# Patient Record
Sex: FEMALE | Race: BLACK OR AFRICAN AMERICAN | NOT HISPANIC OR LATINO | Employment: FULL TIME | URBAN - METROPOLITAN AREA
[De-identification: names, ages, dates, MRNs, and addresses within clinical notes are randomized per-mention and may not be internally consistent; named-entity substitution may affect disease eponyms.]

---

## 2023-10-01 PROBLEM — G89.29 CHRONIC LEFT HIP PAIN: Status: ACTIVE | Noted: 2023-10-01

## 2023-10-01 PROBLEM — G89.29 CHRONIC PAIN: Status: ACTIVE | Noted: 2023-10-01

## 2023-10-01 PROBLEM — M99.01 SEGMENTAL AND SOMATIC DYSFUNCTION OF CERVICAL REGION: Status: ACTIVE | Noted: 2023-10-01

## 2023-10-01 PROBLEM — F41.1 GENERALIZED ANXIETY DISORDER: Status: ACTIVE | Noted: 2023-10-01

## 2023-10-01 PROBLEM — G47.33 OSA (OBSTRUCTIVE SLEEP APNEA): Status: ACTIVE | Noted: 2023-10-01

## 2023-10-01 PROBLEM — E78.5 HYPERLIPIDEMIA: Status: ACTIVE | Noted: 2023-10-01

## 2023-10-01 PROBLEM — M16.11 PRIMARY OSTEOARTHRITIS OF RIGHT HIP: Status: ACTIVE | Noted: 2023-10-01

## 2023-10-01 PROBLEM — R33.9 INCOMPLETE EMPTYING OF BLADDER: Status: ACTIVE | Noted: 2023-10-01

## 2023-10-01 PROBLEM — F17.210 TOBACCO DEPENDENCE DUE TO CIGARETTES: Status: ACTIVE | Noted: 2023-10-01

## 2023-10-01 PROBLEM — E66.811 CLASS 1 OBESITY WITH BODY MASS INDEX (BMI) OF 31.0 TO 31.9 IN ADULT: Status: ACTIVE | Noted: 2023-10-01

## 2023-10-01 PROBLEM — R06.09 DYSPNEA ON EXERTION: Status: ACTIVE | Noted: 2023-10-01

## 2023-10-01 PROBLEM — M99.03 SEGMENTAL AND SOMATIC DYSFUNCTION OF LUMBAR REGION: Status: ACTIVE | Noted: 2023-10-01

## 2023-10-01 PROBLEM — N95.0 POST-MENOPAUSAL BLEEDING: Status: ACTIVE | Noted: 2023-10-01

## 2023-10-01 PROBLEM — R74.8 ELEVATED LIVER ENZYMES: Status: ACTIVE | Noted: 2023-10-01

## 2023-10-01 PROBLEM — E66.9 CLASS 1 OBESITY WITH BODY MASS INDEX (BMI) OF 31.0 TO 31.9 IN ADULT: Status: ACTIVE | Noted: 2023-10-01

## 2023-10-01 PROBLEM — M99.05 SEGMENTAL AND SOMATIC DYSFUNCTION OF PELVIC REGION: Status: ACTIVE | Noted: 2023-10-01

## 2023-10-01 PROBLEM — M48.061 LUMBAR FORAMINAL STENOSIS: Status: ACTIVE | Noted: 2023-10-01

## 2023-10-01 PROBLEM — E03.9 HYPOTHYROIDISM: Status: ACTIVE | Noted: 2023-10-01

## 2023-10-28 PROBLEM — M16.10 ARTHRITIS OF HIP: Status: RESOLVED | Noted: 2018-11-29 | Resolved: 2023-10-28

## 2023-10-28 PROBLEM — Z47.1 AFTERCARE FOLLOWING JOINT REPLACEMENT SURGERY: Status: ACTIVE | Noted: 2021-06-29

## 2023-10-28 PROBLEM — S13.9XXA NECK SPRAIN: Status: ACTIVE | Noted: 2018-08-15

## 2024-06-09 DIAGNOSIS — I10 HYPERTENSION, UNSPECIFIED TYPE: ICD-10-CM

## 2024-06-09 DIAGNOSIS — M54.16 LUMBAR RADICULOPATHY: Primary | ICD-10-CM

## 2024-06-10 RX ORDER — LOSARTAN POTASSIUM 100 MG/1
100 TABLET ORAL DAILY
Qty: 90 TABLET | Refills: 3 | Status: SHIPPED | OUTPATIENT
Start: 2024-06-10 | End: 2025-06-10

## 2024-06-10 RX ORDER — GABAPENTIN 300 MG/1
300 CAPSULE ORAL 3 TIMES DAILY
Qty: 90 CAPSULE | Refills: 10 | Status: SHIPPED | OUTPATIENT
Start: 2024-06-10

## 2024-06-26 NOTE — H&P (VIEW-ONLY)
Subjective   Patient ID: Miguel Angel Dominguez is a 54 y.o. female who presents June 26, 2024 for neck/upper back and low back pain.    (3/15) VPCY    Today, the patient rates their degree of pain as a 4 out of 10 on the numeric pain rating scale.     Miguel Angel returns for continued treatment of their neck/upper back and low back pain. Patient states that she had improvement after last visit that has lasted until today. She states that she has been feeling really well lately. She continues to have neck/upper back discomfort. She states that there has been no change in right hip pain. Denies any associated symptoms or change in medical history since last visit and will follow up in 1 week.       Objective   Physical Exam  Constitutional:       General: She is not in acute distress.     Appearance: Normal appearance.       HENT:      Head: Normocephalic and atraumatic.   Musculoskeletal:      Cervical back: Tenderness present. Decreased range of motion.      Thoracic back: Tenderness present.      Lumbar back: Tenderness present. Decreased range of motion.   Neurological:      General: No focal deficit present.      Mental Status: She is alert and oriented to person, place, and time.      Cranial Nerves: No dysarthria or facial asymmetry.      Sensory: Sensation is intact.      Motor: Motor function is intact.      Coordination: Coordination is intact.      Gait: Gait is intact.   Psychiatric:         Attention and Perception: Attention normal.         Mood and Affect: Mood normal.         Speech: Speech normal.         Behavior: Behavior is cooperative.         Palpation of the following region(s) revealed:  Cervical: Suboccipitals bilateral, hypertonicity and tenderness.  Upper trapezius bilateral, hypertonicity and tenderness.  Levator scapulae bilateral, hypertonicity and tenderness.  Cervical paraspinals bilateral, hypertonicity and tenderness.  Thoracic: Thoracic paraspinals bilateral, hypertonicity and  tenderness.  Middle trapezius bilateral, hypertonicity and tenderness.  Lumbar: Lumbar paraspinals bilateral, hypertonicity and tenderness.  Gluteal bilateral, hypertonicity and tenderness.  Piriformis bilateral, hypertonicity and tenderness.  Upper Extremity: Pectoralis bilateral, hypertonicity and tenderness.      Segmental Joint(s): Segmental joint dysfunction was assessed with motion palpation and is identified in the following areas:  Cervical : C1 C4 C7  Thoracic : T1, T4, T5, and T9  Lumbopelvic / Sacral SIJ : L4, L5, L5/S1, and R SIJ      Assessment/Plan   Today's Treatment Included: Chiropractic manipulation to the Segmental Joint(s) Cervical : C1 C4 C7 Segmental Joint(s) Lumbopelvic/Sacral SIJ : L4, L5, L5/S1, and R SIJ Segmental Joint(s) Thoracic : T1, T4, T5, and T9   Treatment Techniques Used : Diversified CMT, Pelvic drop table technique, and Manual traction  Ischemic compression  Soft-Tissue manipulation    Soft-tissue mobilization was performed in the following areas:   Cervical paraspinal mm. bilateral, Upper Trapezius bilateral, and Levator Scap. bilateral  Thoracic Paraspinal mm. bilateral, Middle Trapezius bilateral, and Pectoralis bilateral  Lumbar Paraspinal mm. bilateral and Gluteal mm. Glute. Med. bilateral        The patient tolerated today's treatment with little or no additional discomfort and was instructed to contact the office for questions or concerns.

## 2024-07-05 ENCOUNTER — APPOINTMENT (OUTPATIENT)
Dept: INTEGRATIVE MEDICINE | Facility: CLINIC | Age: 54
End: 2024-07-05
Payer: COMMERCIAL

## 2024-07-09 DIAGNOSIS — M79.7 FIBROMYALGIA: ICD-10-CM

## 2024-07-09 RX ORDER — PERPHENAZINE 16 MG
TABLET ORAL
Qty: 120 CAPSULE | Refills: 11 | Status: SHIPPED | OUTPATIENT
Start: 2024-07-09 | End: 2024-07-09

## 2024-07-09 RX ORDER — PERPHENAZINE 16 MG
2 TABLET ORAL 3 TIMES DAILY
Qty: 180 CAPSULE | Refills: 11 | Status: SHIPPED | OUTPATIENT
Start: 2024-07-09

## 2024-07-15 ENCOUNTER — APPOINTMENT (OUTPATIENT)
Dept: PAIN MEDICINE | Facility: CLINIC | Age: 54
End: 2024-07-15
Payer: COMMERCIAL

## 2024-07-16 ENCOUNTER — HOSPITAL ENCOUNTER (OUTPATIENT)
Dept: PAIN MEDICINE | Facility: CLINIC | Age: 54
Discharge: HOME | End: 2024-07-16
Payer: COMMERCIAL

## 2024-07-16 VITALS
OXYGEN SATURATION: 99 % | SYSTOLIC BLOOD PRESSURE: 115 MMHG | HEART RATE: 72 BPM | DIASTOLIC BLOOD PRESSURE: 69 MMHG | BODY MASS INDEX: 30.18 KG/M2 | TEMPERATURE: 98.4 F | WEIGHT: 164 LBS | HEIGHT: 62 IN | RESPIRATION RATE: 18 BRPM

## 2024-07-16 DIAGNOSIS — M16.10 HIP ARTHRITIS: ICD-10-CM

## 2024-07-16 DIAGNOSIS — M79.7 FIBROMYALGIA: ICD-10-CM

## 2024-07-16 DIAGNOSIS — M25.551 PAIN OF RIGHT HIP: ICD-10-CM

## 2024-07-16 DIAGNOSIS — M75.82 ROTATOR CUFF TENDINITIS, LEFT: Primary | ICD-10-CM

## 2024-07-16 PROCEDURE — 2500000004 HC RX 250 GENERAL PHARMACY W/ HCPCS (ALT 636 FOR OP/ED): Performed by: ANESTHESIOLOGY

## 2024-07-16 PROCEDURE — 2550000001 HC RX 255 CONTRASTS: Performed by: ANESTHESIOLOGY

## 2024-07-16 PROCEDURE — 20610 DRAIN/INJ JOINT/BURSA W/O US: CPT | Performed by: ANESTHESIOLOGY

## 2024-07-16 PROCEDURE — 2500000005 HC RX 250 GENERAL PHARMACY W/O HCPCS: Performed by: ANESTHESIOLOGY

## 2024-07-16 PROCEDURE — 77002 NEEDLE LOCALIZATION BY XRAY: CPT | Performed by: ANESTHESIOLOGY

## 2024-07-16 PROCEDURE — 2500000002 HC RX 250 W HCPCS SELF ADMINISTERED DRUGS (ALT 637 FOR MEDICARE OP, ALT 636 FOR OP/ED): Performed by: ANESTHESIOLOGY

## 2024-07-16 PROCEDURE — 96372 THER/PROPH/DIAG INJ SC/IM: CPT | Performed by: ANESTHESIOLOGY

## 2024-07-16 RX ORDER — LIDOCAINE HYDROCHLORIDE 5 MG/ML
INJECTION, SOLUTION INFILTRATION; INTRAVENOUS AS NEEDED
Status: COMPLETED | OUTPATIENT
Start: 2024-07-16 | End: 2024-07-16

## 2024-07-16 RX ORDER — LORAZEPAM 2 MG/1
TABLET ORAL
Qty: 1 TABLET | Refills: 0 | Status: SHIPPED | OUTPATIENT
Start: 2024-07-16

## 2024-07-16 RX ORDER — BUPIVACAINE HYDROCHLORIDE 7.5 MG/ML
INJECTION, SOLUTION EPIDURAL; RETROBULBAR AS NEEDED
Status: COMPLETED | OUTPATIENT
Start: 2024-07-16 | End: 2024-07-16

## 2024-07-16 RX ORDER — SODIUM CHLORIDE, SODIUM LACTATE, POTASSIUM CHLORIDE, CALCIUM CHLORIDE 600; 310; 30; 20 MG/100ML; MG/100ML; MG/100ML; MG/100ML
20 INJECTION, SOLUTION INTRAVENOUS CONTINUOUS
OUTPATIENT
Start: 2024-07-16

## 2024-07-16 RX ORDER — DIAZEPAM 5 MG/1
10 TABLET ORAL ONCE
Status: COMPLETED | OUTPATIENT
Start: 2024-07-16 | End: 2024-07-16

## 2024-07-16 RX ORDER — TRIAMCINOLONE ACETONIDE 40 MG/ML
INJECTION, SUSPENSION INTRA-ARTICULAR; INTRAMUSCULAR AS NEEDED
Status: COMPLETED | OUTPATIENT
Start: 2024-07-16 | End: 2024-07-16

## 2024-07-16 ASSESSMENT — PAIN SCALES - GENERAL
PAINLEVEL_OUTOF10: 6
PAINLEVEL_OUTOF10: 5 - MODERATE PAIN

## 2024-07-16 ASSESSMENT — PAIN - FUNCTIONAL ASSESSMENT
PAIN_FUNCTIONAL_ASSESSMENT: 0-10
PAIN_FUNCTIONAL_ASSESSMENT: 0-10

## 2024-07-17 ENCOUNTER — APPOINTMENT (OUTPATIENT)
Dept: GASTROENTEROLOGY | Facility: HOSPITAL | Age: 54
End: 2024-07-17
Payer: COMMERCIAL

## 2024-08-07 ENCOUNTER — DOCUMENTATION (OUTPATIENT)
Dept: PHYSICAL THERAPY | Facility: CLINIC | Age: 54
End: 2024-08-07
Payer: COMMERCIAL

## 2024-08-07 NOTE — PROGRESS NOTES
Physical Therapy    Discharge Summary    Name: Miguel Angel Flores  MRN: [id number]  : 1970  Date: 24    Discharge Summary: PT    Discharge Information: Date of discharge 24, Date of last visit 24, Date of evaluation 24, Number of attended visits 3, Referred by Angeli, and Referred for IT band syndrome s/p RUTHANN    Therapy Summary: Patient seen in PT for 3 visits for hip pain s/p RUTHANN. Patient dns for her last PT visit and did not reschedule.      Discharge Status: Status unknown.      Rehab Discharge Reason: Failed to schedule and/or keep follow-up appointment(s)

## 2024-08-14 ENCOUNTER — OFFICE VISIT (OUTPATIENT)
Dept: CARDIOLOGY | Facility: HOSPITAL | Age: 54
End: 2024-08-14
Payer: COMMERCIAL

## 2024-08-14 VITALS
BODY MASS INDEX: 30 KG/M2 | HEIGHT: 62 IN | WEIGHT: 163 LBS | DIASTOLIC BLOOD PRESSURE: 75 MMHG | SYSTOLIC BLOOD PRESSURE: 115 MMHG | HEART RATE: 60 BPM

## 2024-08-14 DIAGNOSIS — R06.02 SHORTNESS OF BREATH: ICD-10-CM

## 2024-08-14 DIAGNOSIS — E78.5 HYPERLIPIDEMIA, UNSPECIFIED HYPERLIPIDEMIA TYPE: Primary | ICD-10-CM

## 2024-08-14 PROCEDURE — 99214 OFFICE O/P EST MOD 30 MIN: CPT | Performed by: INTERNAL MEDICINE

## 2024-08-14 PROCEDURE — 3008F BODY MASS INDEX DOCD: CPT | Performed by: INTERNAL MEDICINE

## 2024-08-14 NOTE — PROGRESS NOTES
Subjective:  Patient returns for a routine annual follow-up.  She is a 54-year-old female we follow for a recovered cardiomyopathy.  She did have a negative nuclear stress test remotely, so this was presumably a nonischemic cardiomyopathy.    She has not had any hospitalizations since her last visit.  Her medications generally appear to be largely unchanged, and she is taking these compliantly and is tolerating them well.    She denies any clear chest discomfort or palpitation symptoms.  Unfortunately, she has been noticing some progressively limiting exertional dyspnea over at least several months.  She denies any rest symptomatology.  She comes in today for further evaluation.  She did not need any prescriptions renewed.    Objective:  General: Alert, usual pleasant self.  HEENT: Unchanged.  Lungs: Clear without crackles or wheezing.  Cardiac: Distant heart tones without murmur rub or S3.  Abdomen: Nontender.  Extremities: No edema.  Skin: No acute rash.  Neuro: Grossly intact and unchanged.    EKG: Normal sinus rhythm.  Anterior T wave abnormality.  No acute changes.    Lipid panel: Cholesterol-164, HDL-56, LDL-75, TG-163.    Impression/plan:  Little unfortunately has been having some ongoing exertional dyspnea which certainly has her somewhat concerned.  I will plan on setting her up for a repeat echocardiogram just to see if we are not dealing with any recurrent myocardial dysfunction.  Pending the results of this, I will sort out whether we need to make further medication adjustments and also will sort out if we need to consider a repeat ischemic workup.    Patient's lipid panel generally appears to be in fairly good order.  We will continue to monitor this and also keep an eye on her LFTs which have been somewhat elevated.    Patient's heart rate and blood pressure remain under excellent control on her current regimen, so we will continue these unchanged.  She did not need any prescriptions renewed.    I  will review her echo results with her when they are available and make further recommendations as needed.  She knows to call for any intercurrent worsening of her symptomatology.    Patient instructions:    Continue current medications unchanged.    Obtain your echocardiogram when scheduled and call for results.    Return to clinic in 6 months.

## 2024-08-20 NOTE — PROGRESS NOTES
Patient: Miguel Angel Flores    [id number]  : 1970 -- AGE 54 y.o.    Provider: Shandra Woods MD PhD     Location  [facility]   Service Date: 2024              [facility]  Followup Visit Note    HISTORY OF PRESENT ILLNESS     The patient's referring provider is: Shana Tran, RILEY-CNP     HISTORY OF PRESENT ILLNESS   Miguel Angel Flores is a 54 y.o. female with past medical history significant for HTN, chronic pain, fibtromyalgia, back pain, hypothroidism, ARIANNA, hx of cardiomyopathy with recovery who presents to a [facility] for followup.       PAST SLEEP HISTORY  Presented 23 for concerns of JYOTI. She reported that she been told she had JYOTI in the past at [facility]. She has not seen a sleep provider in 3-4 years. She had daytime sleepiness, difficulties remembering things, irritability, and issues staying asleep. She had also been diagnosed with fibromyalgia and being seen at [facility] to implement lifetsyle changes. When she was on CPAP she did feel better and had more energy. However, she just has a hard time using it. She felt she was being smothered. She used nasal pillows and still having issues. She is on APAP at 8-16. She is using CPAP < 1x/week. She feels she just can't fall asleep with it on due to anxiety. She had been started on medication but has not been using recently. She is looking into ketamine infusions.     She did relate some issues with anxiety and depression. She has been seen for this in is on both buspirone and bupropion. There is some discussion about whether she might trial ketamine infusion.    She had a PSG on 4/3/24. She  minutes or SE of 95%. Her RDI3% was 20/h and RDI4% was 10/h. Her ROSALIA was 0.3/h. Her NREM AHI4% was 3/h and REM AHI4% was 38/h. With side sleping her AHI4% was 1.5/h and supine AHI4% was 23/h.    CURRENT HISTORY  At time of her last visit we had started her on APAP at 4-12  cwp for her REM-predominant JYOTI. We wanted to try a nasal mask with her and consider alternatives if persistent issues of sleepiness.    On today's visit,     Sleep schedule:    In bed: 10PM  Activities in bed:  Bedtime Activities: TV on  Subjective sleep latency:  by11-11:30PM  Awakenings during night: 1-2x  Length of awakenings: 30-45 min  Final awakening time: 4:30-5A  Out of bed: 6AM with alarm clock  Overall estimate of total sleep time: 5-6h    Weekends: similar  Preferred sleeping position: supine and sidelying  Typically sleeps alone.     She denies sleep paralysis, cataplexy, or hypnopompic hallucinations.  Some hearing of squeaks in middle of the night.      Daytime Symptoms  On awakening patient reports: wake unrefreshed and feels sleepy    Daytime: During the day, she does not doze unintentionally while inactive.  During more active tasks, she never is drowsy.  With regards to daytime napping, the patient reports sometimes taking naps. If she takes a nap, typically she awakens unrefreshed.  She does not report that poor sleep results in mood-related irritability. She does not report that poor sleep results in issues with memory/concentration.    ESS: 18  NORM: 22  FOSQ: 14      REVIEW OF SYSTEMS     REVIEW OF SYSTEMS  Review of Systems   All other systems reviewed and are negative.        ALLERGIES AND MEDICATIONS     ALLERGIES  Allergies   Allergen Reactions    Lisinopril Cough       MEDICATIONS: She has a current medication list which includes the following prescription(s): alpha lipoic acid - Take 2 capsules by mouth 3 times a day (Patient not taking: Reported on 8/14/2024), amlodipine - TAKE 1 TABLET BY MOUTH EVERY DAY, aspirin - TAKE 1 TABLET BY MOUTH EVERY DAY, atorvastatin - TAKE 1 TABLET BY MOUTH EVERY DAY, bupropion xl - Take 1 tablet (150 mg) by mouth once daily in the morning, coenzyme q-10 - Take 1 capsule (200 mg) by mouth 3 times a day, estradiol - Insert 0.25 Applicatorfuls (1 g) into the  vagina 3 (three) times a week, fluoxetine - Take 1 capsule (20 mg) by mouth once daily, fluticasone - Administer 1 spray into each nostril once daily, gabapentin - TAKE 1 CAPSULE BY MOUTH THREE TIMES A DAY, hydroxyzine hcl - Take 1 tablet (50 mg) by mouth 3 times a day as needed for anxiety, ibuprofen, levothyroxine - TAKE 1 TABLET BY MOUTH EVERY DAY, lorazepam - Take 1 p.o. 1 hour before the procedure, losartan - Take 1 tablet (100 mg) by mouth once daily, nicotine - Place 1 patch over 24 hours on the skin once every 24 hours, nicotine polacrilex - Chew 1 each (4 mg) if needed for smoking cessation, omeprazole - Take 1 capsule (20 mg) by mouth once daily in the morning. Take before meals. Do not crush or chew, polyethylene glycol-electrolytes - Take by mouth, solifenacin - Take 1 tablet (5 mg) by mouth once daily, sumatriptan - Take 1 tablet (50 mg) by mouth.  FOR MIGRAINE RELIEF. MAY REPEAT EVERY 2 HOURS. MAX 200MG/DAY, trazodone - Take 0.5-1 tablets (25-50 mg) by mouth as needed at bedtime, vitamin b complex - Take 1 tablet by mouth 2 times a day, vitamins b complex - Take 1 tablet by mouth 2 times a day, and [DISCONTINUED] amitriptyline - TAKE 1 TABLET BY MOUTH AT BEDTIME FOR MIGRAINE PREVENTION.    PAST MEDICAL HISTORY : She has Cardiomyopathy (Multi); Generalized anxiety disorder; Hyperlipidemia; Hypokalemia; Hypothyroidism; Incomplete emptying of bladder; Post-menopausal bleeding; Rotator cuff tendinitis, left; Rotator cuff tendinitis, right; Dyspnea on exertion; Shortness of breath; Tobacco dependence due to cigarettes; Vaginal atrophy; Vaginal discomfort; Vitamin D deficiency; Cervicalgia; Chronic pain; Class 1 obesity with body mass index (BMI) of 31.0 to 31.9 in adult; Elevated liver enzymes; Fibromyalgia; Hip osteoarthritis; Insomnia; Lumbar foraminal stenosis; Lumbar radiculopathy; Multilevel degenerative disc disease; Myalgia; JYOTI (obstructive sleep apnea); Primary osteoarthritis of right hip;  Sebaceous cyst; Segmental and somatic dysfunction of cervical region; Segmental and somatic dysfunction of lumbar region; Segmental and somatic dysfunction of pelvic region; Segmental and somatic dysfunction of thoracic region; Aftercare following joint replacement surgery; Other hyperlipidemia; Long term (current) use of aspirin; History of drug use; Nicotine dependence; Presence of left artificial hip joint; Abnormal glandular Papanicolaou smear of cervix; Alcohol abuse; Anxiety; Cardiac murmur; Complicated grief; Disc displacement, thoracic; Displacement of lumbar intervertebral disc without myelopathy; Female infertility; GERD (gastroesophageal reflux disease); Graves disease; Hallux valgus, acquired; History of Graves' disease; Lumbar pain; Mild episode of recurrent major depressive disorder (CMS-HCC); Neck sprain; Right knee pain; Sickle-cell trait (CMS-HCC); Tobacco use disorder; Lumbar sprain; PMB (postmenopausal bleeding); Irregular periods; and Iliotibial band syndrome, left on their problem list.    PAST SURGICAL HISTORY: She  has a past surgical history that includes Other surgical history (10/28/2020) and Other surgical history (01/04/2022).     FAMILY HISTORY: No changes since previous visit. Otherwise non-contributory as charted.     SOCIAL HISTORY  She  reports that she has been smoking cigarettes. She has a 17.5 pack-year smoking history. She has been exposed to tobacco smoke. She has never used smokeless tobacco. She reports current alcohol use of about 1.0 standard drink of alcohol per week. She reports current drug use. Drug: Marijuana.  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      PHYSICAL EXAM     Physical Examination: There were no vitals taken for this visit.    PREVIOUS WEIGHTS:  Wt Readings from Last 3 Encounters:   08/14/24 73.9 kg (163 lb)   07/16/24 74.4 kg (164 lb)   04/10/24 74.6 kg (164 lb 6.4 oz)       General: The patient is a pleasant female, in no acute distress. HEENT: She has a   a modified Mallampati grade 2 airway with Numbers; 0-4 (with +): 1+ tonsils bilaterally. The soft palate was normal and the uvula was normal. The A/P diameter of the velopharynx was not narrowed. The oropharynx was not shallow in the A/P diameter. Lateral wall narrowing was present. Tongue ridging waspresent. Erythema of the posterior pharynx was not present. Mucosal hypertrophy in the posterior oropharynx was not present. Retrognathia was not and micrognathia was not present. Neck: The neck was not enlarged. No JVP, bruits or lymphadenopathy was appreciated. Chest: Clear to auscultation. No wheezes, rales, or rhonchi. Cardiovascular: Regular rate and rhythm. No murmurs, gallops, or clicks. Abdomen: Soft, nontender, nondistended. Positive bowel sounds. Extremities: No clubbing, cyanosis, or edema is noted. Neurologic exam: Alert, oriented x3 and was grossly non-focal.      RESULTS/DATA     Iron (ug/dL)   Date Value   05/16/2023 92     Iron Saturation (%)   Date Value   05/16/2023 20 (L)     TIBC (ug/dL)   Date Value   05/16/2023 457 (H)     Ferritin (ug/L)   Date Value   05/16/2023 150       Bicarbonate (mmol/L)   Date Value   02/13/2024 24   05/03/2023 24   02/25/2022 26   01/14/2022 23       PAP Adherence  A PAP adherence download was obtained and data was reviewed personally today in clinic. (see scanned document in EPIC)      DIAGNOSES     Problem List and Orders  There are no diagnoses linked to this encounter.        ASSESSMENT/PLAN     Ms. Flores is a 54 y.o. female and with past medical history significant for HTN, chronic pain, fibtromyalgia, back pain, hypothroidism, ARIANNA.  She returns in followup to  the [facility] for their sleep apnea.    # JYOTI  -She has mild to moderate JYOTI based on her sleep testing - mostly REM-related JYOTI  - Her sleep apnea is more normalized with side sleeping  - Plan a trial of APAP at 4-12 cwp - adjustments to her old machine made  - Gave her a N20  medium mask sample to try  - If that does not work then we can think about alternatives - such as side sleeping - gave examples of sleep positioners  - See if treatment of JYOTI improves her excessive sleepiness    # Depression/Anxiety disorder  - this will play a role in her insomnia and fatigue  - addressing this will be important independent of sleep-related issues     #Chronic insomnia  -Unclear if some of this is related to her untreated sleep apnea  -We will see if this improves once she starts CPAP again or if it gets worse  -Addressing this with her mental health team will be helpful    # OBESITY: We also discussed that obesity is a risk factor for sleep apnea. Weight loss over time could be beneficial from the standpoint for sleep apnea and other health conditions and improving with weight loss.     Follow up: 3-4 months         Shandra Woods MD PhD

## 2024-08-21 ENCOUNTER — APPOINTMENT (OUTPATIENT)
Dept: SLEEP MEDICINE | Facility: CLINIC | Age: 54
End: 2024-08-21
Payer: COMMERCIAL

## 2024-08-29 ENCOUNTER — HOSPITAL ENCOUNTER (OUTPATIENT)
Dept: CARDIOLOGY | Facility: HOSPITAL | Age: 54
Discharge: HOME | End: 2024-08-29
Payer: COMMERCIAL

## 2024-08-29 DIAGNOSIS — R06.02 SHORTNESS OF BREATH: ICD-10-CM

## 2024-08-29 LAB
AORTIC VALVE MEAN GRADIENT: 2 MMHG
AORTIC VALVE PEAK VELOCITY: 0.95 M/S
AV PEAK GRADIENT: 3.6 MMHG
AVA (PEAK VEL): 2.31 CM2
AVA (VTI): 1.97 CM2
EJECTION FRACTION APICAL 4 CHAMBER: 56.4
EJECTION FRACTION: 58 %
LEFT ATRIUM VOLUME AREA LENGTH INDEX BSA: 24.5 ML/M2
LEFT VENTRICLE INTERNAL DIMENSION DIASTOLE: 4.7 CM (ref 3.5–6)
LEFT VENTRICULAR OUTFLOW TRACT DIAMETER: 1.9 CM
MITRAL VALVE E/A RATIO: 0.76
MITRAL VALVE E/E' RATIO: 7.8
RIGHT VENTRICLE FREE WALL PEAK S': 11 CM/S
RIGHT VENTRICLE PEAK SYSTOLIC PRESSURE: 22.5 MMHG
TRICUSPID ANNULAR PLANE SYSTOLIC EXCURSION: 2.4 CM

## 2024-08-29 PROCEDURE — 93306 TTE W/DOPPLER COMPLETE: CPT | Performed by: INTERNAL MEDICINE

## 2024-08-29 PROCEDURE — 93306 TTE W/DOPPLER COMPLETE: CPT

## 2024-09-09 NOTE — PROGRESS NOTES
SUBJECTIVE:  This is 54 y.o.  female with PMH of besity BMI 32, JYOTI, migraine, smoker and marijuana user, multiple musculoskeletal complaints, left RUTHANN and moderate right hip arthritis which failed injections under ultrasound by Ortho, fibromyalgia, migraine headache, lumbar spondylosis, failed IV infusion therapy but responds very well to chiropractic care and acupuncture and wants to continue with that.  She gets gabapentin minimal amount 300 mg takes him at night as needed.  Patient complains of right hip pain and x-ray showed last year progression of her osteoarthritis to moderate disease and she received on 7/16/2024 right hip intra-articular injection who is here for follow-up eating that the injection has helped her by 100% but only for few weeks and the pain is coming back.  The patient has failed in the past meloxicam and diclofenac.  I talked to her about celecoxib and I started her on 200 mg to take 2 of them together for the severe pain and see how long that lasts her for relief.  I recommended not to take the medication on a daily basis.  Will plan on repeat right hip intra-articular injection sometime soon in addition we will plan on psoas muscle injection of the left leg with the lesser trochanter area that may give her great relief of her left hip pain post RUTHANN.  The patient is not willing to have any hip replacement till after the new year when she can afford to take a time off      Prior office visit:  4/6/2024: This is 53 y.o.  female with PMH of obesity BMI 32, JYOTI, migraine, smoker and marijuana user, multiple musculoskeletal complaints, left RUTHANN and right hip arthritis which failed injections under ultrasound by Ortho, fibromyalgia, migraine headache, lumbar spondylosis, failed IV infusion therapy but responds very well to chiropractic care and acupuncture and wants to continue with that.  She gets gabapentin minimal amount 300 mg takes him at night as needed.  Patient received right hip joint  injection on 3/11/2024 who is here for follow-up stating that the hip injection took away her pain presently has minimal discomfort if if so.  The patient still worried about her right RUTHANN because she still have some residual pain in her left hip.  I explained to her that her residual pain from left hip is nothing compared to her left hip arthritis and she should be really happy with the result of which she had from her left hip.  And she should be planning on right hip replacement if the procedure stopped working.  I placed another order for the procedure when patient calls              Procedures:  7/16/2024 right hip joint injection patient has had 100% improvement in pain and function but only for 3 weeks and then gradually pain is coming back  3/11/2024 right hip joint injection the patient has had a 100% improvement in pain and function  IV infusion therapy the patient has had minimal% improvement in pain and function  Ultrasound-guided right hip injections by Ortho minimal response        Portions of record reviewed for pertinent issues: active problem list, medication list, allergies, family history, social history, notes from last encounter, encounters, lab results, imaging and other available records.     I have personally reviewed the [facility] report for this patient. This report is scanned into the electronic medical record. I have considered the risks of abuse, dependence, addiction and diversion. It showed no controlled substance only gabapentin 300 mg 3 times daily  OPIOID RISK ASSESSMENT SCORE 5/26 uses marijuana  Aberrant behavior: None           Employment/disability/litigation: now , Works as a  for [employer] full-time  Social History:  since 2015 now in the final process of adopting 2 beautiful twins Héctor and Sabrina who are present with her in exam. The patient is a smoker and occasional marijuana user denies drinking use of illicit drugs besides marijuana. The patient  finished high school and few college classes                    Diagnostic studies:  10/28/2023 right hip x-ray showed progression of arthritis and now it is moderate osteoarthritis    4/8/2023 MRI of the lumbar spine did not show any bone marrow edema or endplate changes principally the disc space was completely preserved no disc bulge or herniation, there is some facet joint hypertrophy and L4-5 L5-S1 with some spondylosis:  FINDINGS:  LUMBAR SPINE:     Alignment: The vertebral alignment is maintained.     Vertebrae/Intervertebral Discs: The vertebral bodies demonstrate  expected height. The marrow signal is within normal limits. The  intervertebral discs also demonstrate normal signal and morphology.     Conus Medullaris: The lower thoracic cord appears unremarkable. The  conus terminates at L1.     T12-L1: There is no significant central canal stenosis or neural  foraminal narrowing.     L1-2: There is no significant central canal stenosis or neural  foraminal narrowing.     L2-3: There is mild bilateral facet hypertrophy without significant  central canal stenosis or neural foraminal narrowing.     L3-4: There is mild bilateral neural foraminal stenosis secondary to  disc protrusion within the subarticular and foraminal zones and mild  facet hypertrophy. There is no significant central canal stenosis.     L4-5: There is mild-to-moderate facet hypertrophy and disc protrusion  within bilateral subarticular and foraminal zones with resulting mild  bilateral neural foraminal narrowing. There is no significant spinal  canal stenosis.     L5-S1: There is mild-to-moderate bilateral neural foraminal narrowing  secondary to disc protrusion within bilateral subarticular and  foraminal zones and moderate facet hypertrophy. There is no  significant spinal canal stenosis.     Redemonstration of right parapelvic renal cysts. The prevertebral and  posterior paraspinous soft tissues are otherwise grossly unremarkable  on  limited examination of such.     IMPRESSION:  1. Mild multilevel degenerative changes of the lumbar spine most  prominent from the levels of L4-S1 as detailed above.     12/20/2022 right hip x-ray showed mild arthritis         Plan  At least 50% of the visit was involved in the discussion of the options for treatment. We discussed exercises, medication, interventional therapies and surgery. Healthy life style is essential with patient hard work to achieve the wellness. In addition; discussion with the patient and/or family about any of the diagnostic results, impressions and/or recommended diagnostic studies, prognosis, risks and benefits of treatment options, instructions for treatment and/or follow-up, importance of compliance with chosen treatment options, risk-factor reduction, and patient/family education.         Recommended Pool therapy, walking in the pool, at least 3x per week for 30 minutes  Continue self-directed physical therapy with at least daily exercises for minimum of 20-minute, brochure was handed to the patient  Repeat right hip intra-articular injection when patient calls using 80 mg of Depo-Medrol, order and consent were done  Left lesser trochanter psoas muscle injection under fluoroscopy guidance to help with the left hip pain  Healthy lifestyle and anti-inflammatory diet in addition to weight control discussed with the patient  Alternative chronic pain therapies was discussed, encouraged and information was handed  Return to Clinic after the injection     *Please note this report has been produced using speech recognition software and may contain errors related to that system including grammar, punctuation and spelling as well as words and phrases that may be inappropriate. If there are questions or concerns, please feel free to contact me to clarify.    Juliano Riley MD

## 2024-09-10 ENCOUNTER — APPOINTMENT (OUTPATIENT)
Dept: PAIN MEDICINE | Facility: CLINIC | Age: 54
End: 2024-09-10
Payer: COMMERCIAL

## 2024-09-10 VITALS
HEIGHT: 62 IN | SYSTOLIC BLOOD PRESSURE: 95 MMHG | TEMPERATURE: 97.5 F | WEIGHT: 163 LBS | HEART RATE: 65 BPM | DIASTOLIC BLOOD PRESSURE: 73 MMHG | BODY MASS INDEX: 30 KG/M2 | OXYGEN SATURATION: 97 % | RESPIRATION RATE: 16 BRPM

## 2024-09-10 DIAGNOSIS — M16.10 HIP ARTHRITIS: Primary | ICD-10-CM

## 2024-09-10 PROCEDURE — 3008F BODY MASS INDEX DOCD: CPT | Performed by: ANESTHESIOLOGY

## 2024-09-10 PROCEDURE — 99214 OFFICE O/P EST MOD 30 MIN: CPT | Performed by: ANESTHESIOLOGY

## 2024-09-10 RX ORDER — CELECOXIB 200 MG/1
200 CAPSULE ORAL 2 TIMES DAILY
Qty: 60 CAPSULE | Refills: 5 | Status: SHIPPED | OUTPATIENT
Start: 2024-09-10 | End: 2025-03-09

## 2024-09-10 RX ORDER — CLONAZEPAM 0.5 MG/1
0.5 TABLET ORAL DAILY PRN
COMMUNITY
Start: 2024-08-29

## 2024-09-10 RX ORDER — MELOXICAM 7.5 MG/1
15 TABLET ORAL DAILY
Qty: 60 TABLET | Refills: 5 | Status: SHIPPED | OUTPATIENT
Start: 2024-09-10 | End: 2024-09-10 | Stop reason: WASHOUT

## 2024-09-10 SDOH — ECONOMIC STABILITY: FOOD INSECURITY: WITHIN THE PAST 12 MONTHS, YOU WORRIED THAT YOUR FOOD WOULD RUN OUT BEFORE YOU GOT MONEY TO BUY MORE.: NEVER TRUE

## 2024-09-10 SDOH — ECONOMIC STABILITY: FOOD INSECURITY: WITHIN THE PAST 12 MONTHS, THE FOOD YOU BOUGHT JUST DIDN'T LAST AND YOU DIDN'T HAVE MONEY TO GET MORE.: NEVER TRUE

## 2024-09-10 ASSESSMENT — PATIENT HEALTH QUESTIONNAIRE - PHQ9
7. TROUBLE CONCENTRATING ON THINGS, SUCH AS READING THE NEWSPAPER OR WATCHING TELEVISION: NEARLY EVERY DAY
2. FEELING DOWN, DEPRESSED OR HOPELESS: NEARLY EVERY DAY
5. POOR APPETITE OR OVEREATING: NEARLY EVERY DAY
SUM OF ALL RESPONSES TO PHQ9 QUESTIONS 1 AND 2: 6
4. FEELING TIRED OR HAVING LITTLE ENERGY: NEARLY EVERY DAY
3. TROUBLE FALLING OR STAYING ASLEEP OR SLEEPING TOO MUCH: NEARLY EVERY DAY
9. THOUGHTS THAT YOU WOULD BE BETTER OFF DEAD, OR OF HURTING YOURSELF: SEVERAL DAYS
10. IF YOU CHECKED OFF ANY PROBLEMS, HOW DIFFICULT HAVE THESE PROBLEMS MADE IT FOR YOU TO DO YOUR WORK, TAKE CARE OF THINGS AT HOME, OR GET ALONG WITH OTHER PEOPLE: SOMEWHAT DIFFICULT
SUM OF ALL RESPONSES TO PHQ QUESTIONS 1-9: 22
1. LITTLE INTEREST OR PLEASURE IN DOING THINGS: NEARLY EVERY DAY
6. FEELING BAD ABOUT YOURSELF - OR THAT YOU ARE A FAILURE OR HAVE LET YOURSELF OR YOUR FAMILY DOWN: NOT AT ALL
8. MOVING OR SPEAKING SO SLOWLY THAT OTHER PEOPLE COULD HAVE NOTICED. OR THE OPPOSITE, BEING SO FIGETY OR RESTLESS THAT YOU HAVE BEEN MOVING AROUND A LOT MORE THAN USUAL: NEARLY EVERY DAY

## 2024-09-10 ASSESSMENT — ENCOUNTER SYMPTOMS
LOSS OF SENSATION IN FEET: 0
DEPRESSION: 1
OCCASIONAL FEELINGS OF UNSTEADINESS: 1

## 2024-09-10 ASSESSMENT — PAIN - FUNCTIONAL ASSESSMENT: PAIN_FUNCTIONAL_ASSESSMENT: 0-10

## 2024-09-10 ASSESSMENT — PAIN SCALES - GENERAL
PAINLEVEL_OUTOF10: 8
PAINLEVEL: 10-WORST PAIN EVER

## 2024-09-10 ASSESSMENT — COLUMBIA-SUICIDE SEVERITY RATING SCALE - C-SSRS
6. HAVE YOU EVER DONE ANYTHING, STARTED TO DO ANYTHING, OR PREPARED TO DO ANYTHING TO END YOUR LIFE?: NO
2. HAVE YOU ACTUALLY HAD ANY THOUGHTS OF KILLING YOURSELF?: NO
1. IN THE PAST MONTH, HAVE YOU WISHED YOU WERE DEAD OR WISHED YOU COULD GO TO SLEEP AND NOT WAKE UP?: NO

## 2024-09-10 ASSESSMENT — PAIN DESCRIPTION - DESCRIPTORS: DESCRIPTORS: ACHING;SHARP

## 2024-09-10 ASSESSMENT — LIFESTYLE VARIABLES
HOW MANY STANDARD DRINKS CONTAINING ALCOHOL DO YOU HAVE ON A TYPICAL DAY: PATIENT DOES NOT DRINK
HAS A RELATIVE, FRIEND, DOCTOR, OR ANOTHER HEALTH PROFESSIONAL EXPRESSED CONCERN ABOUT YOUR DRINKING OR SUGGESTED YOU CUT DOWN: NO
SKIP TO QUESTIONS 9-10: 1
AUDIT TOTAL SCORE: 1
AUDIT-C TOTAL SCORE: 1
HOW OFTEN DO YOU HAVE SIX OR MORE DRINKS ON ONE OCCASION: NEVER
HOW OFTEN DO YOU HAVE A DRINK CONTAINING ALCOHOL: MONTHLY OR LESS
HAVE YOU OR SOMEONE ELSE BEEN INJURED AS A RESULT OF YOUR DRINKING: NO

## 2024-09-10 NOTE — PROGRESS NOTES
This is 54 y.o.  female with who has been treated for  right hip pain  . Pain was 100%  better, then started to to wear off two weeks ago . The pain is now back .The pain is described as achiness and sharp and is relieved by Lying down .Here for follow-up.     Patient has right sided groin pain, and right knee achy .  Chief Complaint   Patient presents with    Follow-up     PHQ 22  Pain Therapies: Injections

## 2024-09-14 DIAGNOSIS — M54.16 RADICULOPATHY, LUMBAR REGION: ICD-10-CM

## 2024-09-16 ENCOUNTER — TELEPHONE (OUTPATIENT)
Dept: CARDIOLOGY | Facility: HOSPITAL | Age: 54
End: 2024-09-16
Payer: COMMERCIAL

## 2024-09-16 RX ORDER — B-COMPLEX WITH VITAMIN C
1 TABLET ORAL 2 TIMES DAILY
Qty: 180 TABLET | Refills: 3 | Status: SHIPPED | OUTPATIENT
Start: 2024-09-16

## 2024-10-15 ENCOUNTER — TELEPHONE (OUTPATIENT)
Dept: PAIN MEDICINE | Facility: CLINIC | Age: 54
End: 2024-10-15
Payer: COMMERCIAL

## 2024-10-15 ENCOUNTER — APPOINTMENT (OUTPATIENT)
Dept: PAIN MEDICINE | Facility: CLINIC | Age: 54
End: 2024-10-15
Payer: COMMERCIAL

## 2024-10-15 DIAGNOSIS — M54.16 LUMBAR RADICULOPATHY: Primary | ICD-10-CM

## 2024-10-15 RX ORDER — LORAZEPAM 2 MG/1
TABLET ORAL
Qty: 1 TABLET | Refills: 0 | Status: SHIPPED | OUTPATIENT
Start: 2024-10-15

## 2024-10-16 ENCOUNTER — APPOINTMENT (OUTPATIENT)
Dept: PRIMARY CARE | Facility: CLINIC | Age: 54
End: 2024-10-16
Payer: COMMERCIAL

## 2024-10-16 DIAGNOSIS — N95.1 HOT FLASHES DUE TO MENOPAUSE: Primary | ICD-10-CM

## 2024-10-16 DIAGNOSIS — R82.998 DARK URINE: ICD-10-CM

## 2024-10-16 PROCEDURE — 99214 OFFICE O/P EST MOD 30 MIN: CPT | Performed by: NURSE PRACTITIONER

## 2024-10-16 RX ORDER — FLUOXETINE 10 MG/1
10 CAPSULE ORAL DAILY
Qty: 30 CAPSULE | Refills: 0 | Status: SHIPPED | OUTPATIENT
Start: 2024-10-16 | End: 2024-12-15

## 2024-10-16 ASSESSMENT — PATIENT HEALTH QUESTIONNAIRE - PHQ9
2. FEELING DOWN, DEPRESSED OR HOPELESS: NOT AT ALL
1. LITTLE INTEREST OR PLEASURE IN DOING THINGS: NOT AT ALL
SUM OF ALL RESPONSES TO PHQ9 QUESTIONS 1 AND 2: 0

## 2024-10-16 ASSESSMENT — ENCOUNTER SYMPTOMS
OCCASIONAL FEELINGS OF UNSTEADINESS: 0
LOSS OF SENSATION IN FEET: 0
DEPRESSION: 0

## 2024-10-16 NOTE — PROGRESS NOTES
Subjective   Patient ID: Miguel Angel Flores is a 54 y.o. female who presents virtually with complaints of sharp, shooting pain left side temple lasting 30 seconds. TTP. Left ear was sensitive to cold air or wind. Hx migraines in the colder months. This was occurring episodically. No pain for the past week. She tried OTC migraine medication but unsure if it had any effect.  In the past she took sumatriptan for migraines  Recently started Celebrex for arthritis, Ok'd by cardiologist. Also started on fluoxetine for hot flashes. Celebrex has helped quite a bit. No improvement in hot flashes with fluoxetine.     Also complaining of cloudy, dark urine  HPI    grief, anxiety, depression: followed by psych right now, weaned off Wellbutrin 150 mg and Lexapro 20 mg over the past few weeks, felt she no longer needed meds and liver enzymes improved. She has felt more anxious and depressed, restarted wellbutrin. She lost her brother 12/19/21. She lost her mother and brother in 1986 and did not respond well to the grief, she wound up abusing drugs to treat the pain.      hx PE: completed Eliquis. Provoked PE from left total hip replacement with Dr. Dillan Greco 6/28/21 at [facility]. She developed SOB and flank pain and went to ER- found to have PE LLL.   HTN, hx cardiomyopathy: followed by Dr. Wilehlm. On losartan 100 mg QD for HTN; did not tolerate Lisinopril r/t ACE cough. amlodipine 5 mg QD. Did not tolerate BB due to baseline bradycardia. Recent echo showed reduced ejection fraction. She is not checking BP at home.   GYN: followed by Dr. Gay. Pap UTD 02/2021. OAB: followed by Avani Ta; on solifenacin succinate 5 mg every day. Using estrace vaginal cream.   hypothyroidism: Graves disease, s/p thyroidectomy in ~2000 at [facility]. levothyroxine 125 mcg QD.   OA R hip: s/p hip replacement L hip. Followed by Kawme Suh. She is doing PT. Seeing pain management tomorrow  lumbar b/l radiculopathy:  responds to aqua  therapy, acupuncture. epidural steroid injections, and surgery down the line if no improvement with conservative measures. She will be seeing PM. She is taking gabapentin 300 mg 2 pills QHS PRN; which she thinks helps with nerve pain. pain did not improve with meloxicam and methocarbamol. She tried ketamine infusions for fibromyalgia with PM.   JYOTI: pt states she was tested and never started using cpap, will need repeat testing.      SOCIAL: She is living at home with 2 adopted children twin 12 year old girls, fostered at 8 y/o. 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   no alcohol use.   tobacco use: 1/2 ppd 10 cigarettes for 35 years. She has tried Chantix with no success.   Review of Systems  Objective   There were no vitals taken for this visit.    Physical Exam  Constitutional:       Appearance: Normal appearance.   Eyes:      Pupils: Pupils are equal, round, and reactive to light.   Neurological:      General: No focal deficit present.      Mental Status: She is alert and oriented to person, place, and time.   Psychiatric:         Mood and Affect: Mood normal.         Behavior: Behavior normal.         Thought Content: Thought content normal.         Judgment: Judgment normal.       Assessment/Plan   Diagnoses and all orders for this visit:  1. Hot flashes due to menopause (Primary)  - wean FLUoxetine (PROzac) 10 mg capsule; Take 1 capsule (10 mg) by mouth once daily.  Dispense: 30 capsule; Refill: 0  Pain in temples: seems to have resolved. If it comes back try ibuprofen 600 mg TID x1 day, limited use for NSAID due to celebrex. Can consider sumatriptan in the future, worked for her migraines in the past.   2. Dark urine- Microscopic Only, Urine; Future  - Urine Culture; Future   Tobacco dependence due to cigarettes  -     nicotine (Nicoderm CQ) 14 mg/24 hr patch; Place 1 patch over 24 hours on the skin once every 24 hours.  -      nicotine (Nicoderm CQ) 7 mg/24 hr patch; Place 1 patch over 24 hours on the skin once every 24 hours.  -     nicotine polacrilex (Nicorette) 4 mg gum; Chew 1 each (4 mg) if needed for smoking cessation.    migraines: imitrex PRN.   urinary incontinence: continue solifenacin succinate 5 mg QD per urogyn   hyperlipidemia: stable, continue atorvastatin 10 mg every day, lipid panel ordered   HTN: stable, continue amlodipine 5 mg QD, losartan 100 mg QD  hypothyroidism: levothyroxine to 125 mcg every day, TSH ordered.   vaginal irritation, OAB: continue estradiol cream per GYN    health maintenance: paps WNL 02/2021. mammogram due 05/2024. colonoscopy ordered.   CPE due 2/7/2025  Patient verbalized understanding and agree to plan of care.  Instructed to call or schedule appointment if any changes or as needed.

## 2024-10-31 ENCOUNTER — APPOINTMENT (OUTPATIENT)
Dept: INTEGRATIVE MEDICINE | Facility: CLINIC | Age: 54
End: 2024-10-31
Payer: COMMERCIAL

## 2024-11-01 ENCOUNTER — HOSPITAL ENCOUNTER (EMERGENCY)
Facility: HOSPITAL | Age: 54
Discharge: HOME | End: 2024-11-01
Attending: EMERGENCY MEDICINE
Payer: COMMERCIAL

## 2024-11-01 ENCOUNTER — APPOINTMENT (OUTPATIENT)
Dept: RADIOLOGY | Facility: HOSPITAL | Age: 54
End: 2024-11-01
Payer: COMMERCIAL

## 2024-11-01 VITALS
BODY MASS INDEX: 30 KG/M2 | DIASTOLIC BLOOD PRESSURE: 87 MMHG | HEIGHT: 62 IN | RESPIRATION RATE: 16 BRPM | HEART RATE: 72 BPM | SYSTOLIC BLOOD PRESSURE: 116 MMHG | WEIGHT: 163 LBS | TEMPERATURE: 99.5 F | OXYGEN SATURATION: 93 %

## 2024-11-01 DIAGNOSIS — J40 BRONCHITIS: ICD-10-CM

## 2024-11-01 DIAGNOSIS — U07.1 COVID: Primary | ICD-10-CM

## 2024-11-01 LAB
ALBUMIN SERPL BCP-MCNC: 4.4 G/DL (ref 3.4–5)
ALP SERPL-CCNC: 87 U/L (ref 33–110)
ALT SERPL W P-5'-P-CCNC: 86 U/L (ref 7–45)
ANION GAP SERPL CALC-SCNC: 11 MMOL/L (ref 10–20)
APTT PPP: 29 SECONDS (ref 27–38)
AST SERPL W P-5'-P-CCNC: 54 U/L (ref 9–39)
BASOPHILS # BLD AUTO: 0.03 X10*3/UL (ref 0–0.1)
BASOPHILS NFR BLD AUTO: 0.5 %
BILIRUB SERPL-MCNC: 0.7 MG/DL (ref 0–1.2)
BUN SERPL-MCNC: 9 MG/DL (ref 6–23)
CALCIUM SERPL-MCNC: 9 MG/DL (ref 8.6–10.3)
CARDIAC TROPONIN I PNL SERPL HS: 3 NG/L (ref 0–13)
CHLORIDE SERPL-SCNC: 111 MMOL/L (ref 98–107)
CO2 SERPL-SCNC: 24 MMOL/L (ref 21–32)
CREAT SERPL-MCNC: 0.87 MG/DL (ref 0.5–1.05)
EGFRCR SERPLBLD CKD-EPI 2021: 79 ML/MIN/1.73M*2
EOSINOPHIL # BLD AUTO: 0.11 X10*3/UL (ref 0–0.7)
EOSINOPHIL NFR BLD AUTO: 1.9 %
ERYTHROCYTE [DISTWIDTH] IN BLOOD BY AUTOMATED COUNT: 12.9 % (ref 11.5–14.5)
FLUAV RNA RESP QL NAA+PROBE: NOT DETECTED
FLUBV RNA RESP QL NAA+PROBE: NOT DETECTED
GLUCOSE SERPL-MCNC: 99 MG/DL (ref 74–99)
HCT VFR BLD AUTO: 41.7 % (ref 36–46)
HGB BLD-MCNC: 14.1 G/DL (ref 12–16)
IMM GRANULOCYTES # BLD AUTO: 0.02 X10*3/UL (ref 0–0.7)
IMM GRANULOCYTES NFR BLD AUTO: 0.3 % (ref 0–0.9)
INR PPP: 1.1 (ref 0.9–1.1)
LYMPHOCYTES # BLD AUTO: 1.76 X10*3/UL (ref 1.2–4.8)
LYMPHOCYTES NFR BLD AUTO: 30.1 %
MCH RBC QN AUTO: 30.2 PG (ref 26–34)
MCHC RBC AUTO-ENTMCNC: 33.8 G/DL (ref 32–36)
MCV RBC AUTO: 89 FL (ref 80–100)
MONOCYTES # BLD AUTO: 0.77 X10*3/UL (ref 0.1–1)
MONOCYTES NFR BLD AUTO: 13.2 %
NEUTROPHILS # BLD AUTO: 3.15 X10*3/UL (ref 1.2–7.7)
NEUTROPHILS NFR BLD AUTO: 54 %
NRBC BLD-RTO: 0 /100 WBCS (ref 0–0)
PLATELET # BLD AUTO: 217 X10*3/UL (ref 150–450)
POTASSIUM SERPL-SCNC: 3.7 MMOL/L (ref 3.5–5.3)
PROT SERPL-MCNC: 8.1 G/DL (ref 6.4–8.2)
PROTHROMBIN TIME: 12.8 SECONDS (ref 9.8–12.8)
RBC # BLD AUTO: 4.67 X10*6/UL (ref 4–5.2)
RSV RNA RESP QL NAA+PROBE: NOT DETECTED
SARS-COV-2 RNA RESP QL NAA+PROBE: DETECTED
SODIUM SERPL-SCNC: 142 MMOL/L (ref 136–145)
WBC # BLD AUTO: 5.8 X10*3/UL (ref 4.4–11.3)

## 2024-11-01 PROCEDURE — 85730 THROMBOPLASTIN TIME PARTIAL: CPT | Performed by: EMERGENCY MEDICINE

## 2024-11-01 PROCEDURE — 36415 COLL VENOUS BLD VENIPUNCTURE: CPT | Performed by: EMERGENCY MEDICINE

## 2024-11-01 PROCEDURE — 85610 PROTHROMBIN TIME: CPT | Performed by: EMERGENCY MEDICINE

## 2024-11-01 PROCEDURE — 99283 EMERGENCY DEPT VISIT LOW MDM: CPT | Mod: 25

## 2024-11-01 PROCEDURE — 2500000002 HC RX 250 W HCPCS SELF ADMINISTERED DRUGS (ALT 637 FOR MEDICARE OP, ALT 636 FOR OP/ED): Performed by: EMERGENCY MEDICINE

## 2024-11-01 PROCEDURE — 80053 COMPREHEN METABOLIC PANEL: CPT | Performed by: EMERGENCY MEDICINE

## 2024-11-01 PROCEDURE — 87634 RSV DNA/RNA AMP PROBE: CPT | Performed by: EMERGENCY MEDICINE

## 2024-11-01 PROCEDURE — 94640 AIRWAY INHALATION TREATMENT: CPT

## 2024-11-01 PROCEDURE — 84484 ASSAY OF TROPONIN QUANT: CPT | Performed by: EMERGENCY MEDICINE

## 2024-11-01 PROCEDURE — 2500000004 HC RX 250 GENERAL PHARMACY W/ HCPCS (ALT 636 FOR OP/ED): Performed by: EMERGENCY MEDICINE

## 2024-11-01 PROCEDURE — 87636 SARSCOV2 & INF A&B AMP PRB: CPT | Performed by: EMERGENCY MEDICINE

## 2024-11-01 PROCEDURE — 71045 X-RAY EXAM CHEST 1 VIEW: CPT

## 2024-11-01 PROCEDURE — 85025 COMPLETE CBC W/AUTO DIFF WBC: CPT | Performed by: EMERGENCY MEDICINE

## 2024-11-01 PROCEDURE — 71045 X-RAY EXAM CHEST 1 VIEW: CPT | Performed by: RADIOLOGY

## 2024-11-01 RX ORDER — ALBUTEROL SULFATE 0.83 MG/ML
2.5 SOLUTION RESPIRATORY (INHALATION) EVERY 6 HOURS PRN
Qty: 3 ML | Refills: 0 | Status: SHIPPED | OUTPATIENT
Start: 2024-11-01 | End: 2024-12-01

## 2024-11-01 RX ORDER — PREDNISONE 20 MG/1
20 TABLET ORAL ONCE
Status: COMPLETED | OUTPATIENT
Start: 2024-11-01 | End: 2024-11-01

## 2024-11-01 RX ORDER — ALBUTEROL SULFATE 0.83 MG/ML
2.5 SOLUTION RESPIRATORY (INHALATION) ONCE
Status: COMPLETED | OUTPATIENT
Start: 2024-11-01 | End: 2024-11-01

## 2024-11-01 RX ORDER — PREDNISONE 20 MG/1
20 TABLET ORAL DAILY
Qty: 5 TABLET | Refills: 0 | Status: SHIPPED | OUTPATIENT
Start: 2024-11-01 | End: 2024-11-06

## 2024-11-01 ASSESSMENT — PAIN DESCRIPTION - ORIENTATION: ORIENTATION: INNER

## 2024-11-01 ASSESSMENT — PAIN - FUNCTIONAL ASSESSMENT: PAIN_FUNCTIONAL_ASSESSMENT: 0-10

## 2024-11-01 ASSESSMENT — COLUMBIA-SUICIDE SEVERITY RATING SCALE - C-SSRS
2. HAVE YOU ACTUALLY HAD ANY THOUGHTS OF KILLING YOURSELF?: NO
6. HAVE YOU EVER DONE ANYTHING, STARTED TO DO ANYTHING, OR PREPARED TO DO ANYTHING TO END YOUR LIFE?: NO
1. IN THE PAST MONTH, HAVE YOU WISHED YOU WERE DEAD OR WISHED YOU COULD GO TO SLEEP AND NOT WAKE UP?: NO

## 2024-11-01 ASSESSMENT — PAIN DESCRIPTION - PAIN TYPE: TYPE: ACUTE PAIN

## 2024-11-01 ASSESSMENT — PAIN SCALES - GENERAL: PAINLEVEL_OUTOF10: 10 - WORST POSSIBLE PAIN

## 2024-11-01 ASSESSMENT — PAIN DESCRIPTION - LOCATION: LOCATION: HEAD

## 2024-11-06 ENCOUNTER — APPOINTMENT (OUTPATIENT)
Dept: INTEGRATIVE MEDICINE | Facility: CLINIC | Age: 54
End: 2024-11-06
Payer: COMMERCIAL

## 2024-11-06 ENCOUNTER — TELEPHONE (OUTPATIENT)
Dept: PRIMARY CARE | Facility: CLINIC | Age: 54
End: 2024-11-06
Payer: COMMERCIAL

## 2024-11-06 NOTE — TELEPHONE ENCOUNTER
Patient calling because she was recently diagnosed with Covid and bronchitis in the ER. Patient was given prednisone and she has been taking Delsym and robitussin. She wants to know if there is anything you can prescribe for her cough. Please advise.

## 2024-11-09 DIAGNOSIS — N95.1 HOT FLASHES DUE TO MENOPAUSE: ICD-10-CM

## 2024-11-11 ENCOUNTER — OFFICE VISIT (OUTPATIENT)
Dept: PRIMARY CARE | Facility: CLINIC | Age: 54
End: 2024-11-11
Payer: COMMERCIAL

## 2024-11-11 VITALS
WEIGHT: 158.73 LBS | SYSTOLIC BLOOD PRESSURE: 122 MMHG | OXYGEN SATURATION: 96 % | HEART RATE: 57 BPM | BODY MASS INDEX: 29.03 KG/M2 | DIASTOLIC BLOOD PRESSURE: 79 MMHG

## 2024-11-11 DIAGNOSIS — R05.3 PERSISTENT COUGH: Primary | ICD-10-CM

## 2024-11-11 PROCEDURE — 99213 OFFICE O/P EST LOW 20 MIN: CPT | Performed by: NURSE PRACTITIONER

## 2024-11-11 RX ORDER — DOXYCYCLINE 100 MG/1
100 CAPSULE ORAL 2 TIMES DAILY
Qty: 14 CAPSULE | Refills: 0 | Status: SHIPPED | OUTPATIENT
Start: 2024-11-11 | End: 2024-11-18

## 2024-11-11 ASSESSMENT — ENCOUNTER SYMPTOMS
LOSS OF SENSATION IN FEET: 0
OCCASIONAL FEELINGS OF UNSTEADINESS: 0
DEPRESSION: 0

## 2024-11-11 NOTE — PROGRESS NOTES
Subjective   Patient ID: Miguel Angel Flores is a 54 y.o. female who presents with complaints of chest pain following covid 2 weeks ago. Still feeling fatigued, tired. Still coughing, SOB. Mucinex has been helping.     HPI    grief, anxiety, depression: followed by psych right now, weaned off Wellbutrin 150 mg and Lexapro 20 mg over the past few weeks, felt she no longer needed meds and liver enzymes improved. She has felt more anxious and depressed, restarted wellbutrin. She lost her brother 12/19/21. She lost her mother and brother in 1986 and did not respond well to the grief, she wound up abusing drugs to treat the pain.      hx PE: completed Eliquis. Provoked PE from left total hip replacement with Dr. Dillan Greco 6/28/21 at [facility]. She developed SOB and flank pain and went to ER- found to have PE LLL.   HTN, hx cardiomyopathy: followed by Dr. Wilhelm. On losartan 100 mg QD for HTN; did not tolerate Lisinopril r/t ACE cough. amlodipine 5 mg QD. Did not tolerate BB due to baseline bradycardia. Recent echo showed reduced ejection fraction. She is not checking BP at home.   GYN: followed by Dr. Gay. Pap UTD 02/2021. OAB: followed by Avani Ta; on solifenacin succinate 5 mg every day. Using estrace vaginal cream.   hypothyroidism: Graves disease, s/p thyroidectomy in ~2000 at [facility]. levothyroxine 125 mcg QD.   OA R hip: s/p hip replacement L hip. Followed by Kwame Suh. She is doing PT. Seeing pain management tomorrow  lumbar b/l radiculopathy:  responds to aqua therapy, acupuncture. epidural steroid injections, and surgery down the line if no improvement with conservative measures. She will be seeing PM. She is taking gabapentin 300 mg 2 pills QHS PRN; which she thinks helps with nerve pain. pain did not improve with meloxicam and methocarbamol. She tried ketamine infusions for fibromyalgia with PM. Recently started Celebrex for arthritis, Ok'd by cardiologist. Also started on fluoxetine for  hot flashes. Celebrex has helped quite a bit.  JYOTI: pt states she was tested and never started using cpap, will need repeat testing.      SOCIAL: She is living at home with 2 adopted children twin 12 year old girls, fostered at 8 y/o. 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 Chelsey dog at home.   no alcohol use.   tobacco use: 1/2 ppd 10 cigarettes for 35 years. She has tried Chantix with no success.   Review of Systems   Cardiovascular:  Positive for chest pain.     Objective   /79   Pulse 57   Wt 72 kg (158 lb 11.7 oz)   LMP  (LMP Unknown)   SpO2 95%   BMI 29.03 kg/m²     Physical Exam  Constitutional:       Appearance: Normal appearance.   Eyes:      Pupils: Pupils are equal, round, and reactive to light.   Cardiovascular:      Rate and Rhythm: Normal rate and regular rhythm.   Pulmonary:      Effort: Pulmonary effort is normal. No respiratory distress.      Breath sounds: Normal breath sounds. No wheezing or rhonchi.   Chest:      Chest wall: No tenderness.   Neurological:      General: No focal deficit present.      Mental Status: She is alert and oriented to person, place, and time.   Psychiatric:         Mood and Affect: Mood normal.         Behavior: Behavior normal.         Thought Content: Thought content normal.         Judgment: Judgment normal.       Assessment/Plan   Diagnoses and all orders for this visit:  1. Persistent cough (Primary)  - XR chest 2 views; Future  - doxycycline (Vibramycin) 100 mg capsule; Take 1 capsule (100 mg) by mouth 2 times a day for 7 days. Take with at least 8 ounces (large glass) of water, do not lie down for 30 minutes after  Dispense: 14 capsule; Refill: 0   Continue mucinex. Discussed possibility of costochondritis.     Pain in temples: seems to have resolved. If it comes back try ibuprofen 600 mg TID x1 day, limited use for NSAID due to celebrex. Can consider sumatriptan in the future, worked for her  migraines in the past.     Tobacco dependence due to cigarettes  -     nicotine (Nicoderm CQ) 14 mg/24 hr patch; Place 1 patch over 24 hours on the skin once every 24 hours.  -     nicotine (Nicoderm CQ) 7 mg/24 hr patch; Place 1 patch over 24 hours on the skin once every 24 hours.  -     nicotine polacrilex (Nicorette) 4 mg gum; Chew 1 each (4 mg) if needed for smoking cessation.    migraines: imitrex PRN.   urinary incontinence: continue solifenacin succinate 5 mg QD per urogyn   hyperlipidemia: stable, continue atorvastatin 10 mg every day, lipid panel ordered   HTN: stable, continue amlodipine 5 mg QD, losartan 100 mg QD  hypothyroidism: levothyroxine to 125 mcg every day, TSH ordered.   vaginal irritation, OAB: continue estradiol cream per GYN    health maintenance: paps WNL 02/2021. mammogram due 05/2024. colonoscopy ordered.   CPE due 2/7/2025  Patient verbalized understanding and agree to plan of care.  Instructed to call or schedule appointment if any changes or as needed.

## 2024-11-12 DIAGNOSIS — I42.9 CARDIOMYOPATHY, UNSPECIFIED TYPE (MULTI): ICD-10-CM

## 2024-11-12 DIAGNOSIS — M79.7 FIBROMYALGIA: ICD-10-CM

## 2024-11-12 DIAGNOSIS — I10 HYPERTENSION, UNSPECIFIED TYPE: ICD-10-CM

## 2024-11-12 RX ORDER — AMLODIPINE BESYLATE 5 MG/1
5 TABLET ORAL DAILY
Qty: 90 TABLET | Refills: 3 | Status: SHIPPED | OUTPATIENT
Start: 2024-11-12 | End: 2025-11-12

## 2024-11-12 RX ORDER — FLUOXETINE 10 MG/1
10 CAPSULE ORAL DAILY
Qty: 90 CAPSULE | Refills: 1 | Status: SHIPPED | OUTPATIENT
Start: 2024-11-12

## 2024-11-13 ENCOUNTER — LAB (OUTPATIENT)
Dept: LAB | Facility: LAB | Age: 54
End: 2024-11-13
Payer: COMMERCIAL

## 2024-11-13 ENCOUNTER — HOSPITAL ENCOUNTER (OUTPATIENT)
Dept: RADIOLOGY | Facility: HOSPITAL | Age: 54
Discharge: HOME | End: 2024-11-13
Payer: COMMERCIAL

## 2024-11-13 DIAGNOSIS — R05.3 PERSISTENT COUGH: ICD-10-CM

## 2024-11-13 DIAGNOSIS — R82.998 DARK URINE: ICD-10-CM

## 2024-11-13 LAB
AMORPH CRY #/AREA UR COMP ASSIST: ABNORMAL /HPF
BACTERIA #/AREA URNS AUTO: ABNORMAL /HPF
MUCOUS THREADS #/AREA URNS AUTO: ABNORMAL /LPF
RBC #/AREA URNS AUTO: ABNORMAL /HPF
SQUAMOUS #/AREA URNS AUTO: ABNORMAL /HPF
WBC #/AREA URNS AUTO: ABNORMAL /HPF

## 2024-11-13 PROCEDURE — 81001 URINALYSIS AUTO W/SCOPE: CPT

## 2024-11-13 PROCEDURE — 71046 X-RAY EXAM CHEST 2 VIEWS: CPT

## 2024-11-13 PROCEDURE — 71046 X-RAY EXAM CHEST 2 VIEWS: CPT | Performed by: STUDENT IN AN ORGANIZED HEALTH CARE EDUCATION/TRAINING PROGRAM

## 2024-11-13 RX ORDER — ASPIRIN 81 MG/1
81 TABLET ORAL DAILY
Qty: 90 TABLET | Refills: 3 | Status: SHIPPED | OUTPATIENT
Start: 2024-11-13

## 2024-11-14 ENCOUNTER — APPOINTMENT (OUTPATIENT)
Dept: INTEGRATIVE MEDICINE | Facility: CLINIC | Age: 54
End: 2024-11-14
Payer: COMMERCIAL

## 2024-11-14 RX ORDER — ACETAMINOPHEN 160 MG/5ML
200 SUSPENSION, ORAL (FINAL DOSE FORM) ORAL 3 TIMES DAILY
Qty: 90 CAPSULE | Refills: 11 | Status: SHIPPED | OUTPATIENT
Start: 2024-11-14

## 2024-11-19 ENCOUNTER — APPOINTMENT (OUTPATIENT)
Dept: PAIN MEDICINE | Facility: CLINIC | Age: 54
End: 2024-11-19
Payer: COMMERCIAL

## 2024-11-20 ENCOUNTER — APPOINTMENT (OUTPATIENT)
Dept: INTEGRATIVE MEDICINE | Facility: CLINIC | Age: 54
End: 2024-11-20
Payer: COMMERCIAL

## 2024-11-25 ENCOUNTER — APPOINTMENT (OUTPATIENT)
Dept: PAIN MEDICINE | Facility: CLINIC | Age: 54
End: 2024-11-25
Payer: COMMERCIAL

## 2024-11-26 DIAGNOSIS — M16.11 UNILATERAL PRIMARY OSTEOARTHRITIS, RIGHT HIP: ICD-10-CM

## 2024-11-27 RX ORDER — IBUPROFEN 800 MG/1
800 TABLET ORAL 3 TIMES DAILY
Qty: 30 TABLET | Refills: 35 | Status: SHIPPED | OUTPATIENT
Start: 2024-11-27

## 2024-12-03 NOTE — PROGRESS NOTES
Patient: Miguel Angel Flores    [id number]  : 1970 -- AGE 54 y.o.    Provider: Shandra Woods MD PhD     Location  [facility]   Service Date: 12/3/2024              [facility]  Followup Visit Note    HISTORY OF PRESENT ILLNESS     The patient's referring provider is: Shana Tran, RILEY-CNP     HISTORY OF PRESENT ILLNESS   Miguel Angel Flores is a 54 y.o. female with past medical history significant for HTN, chronic pain, fibtromyalgia, back pain, hypothroidism, ARIANNA who presents to a [facility] for followup.       PAST SLEEP HISTORY  Presented 23 for concerns of JYOTI. She reported that she been told she had JYOTI in the past at [facility], [place], operated by [facility]. She has not seen a sleep provider in 3-4 years. She had daytime sleepiness, difficulties remembering things, irritability, and issues staying asleep. She had also been diagnosed with fibromyalgia and being seen at [facility] to implement lifetsyle changes. When she was on CPAP she did feel better and had more energy. However, she just has a hard time using it. She felt she was being smothered. She used nasal pillows and still having issues. She is on APAP at 8-16. She is using CPAP < 1x/week. She feels she just can't fall asleep with it on due to anxiety. She had been started on medication but has not been using recently. She is looking into ketamine infusions.     She did relate some issues with anxiety and depression. She has been seen for this in is on both buspirone and bupropion. There is some discussion about whether she might trial ketamine infusion.    She had a PSG on 4/3/24. She  minutes or SE of 95%. Her RDI3% was 20/h and RDI4% was 10/h. Her ROSALIA was 0.3/h. Her NREM AHI4% was 3/h and REM AHI4% was 38/h. With side sleping her AHI4% was 1.5/h and supine AHI4% was 23/h.    CURRENT HISTORY  At time of her last visit we had recommended treating her JYOTI with APAP at 4-12 cwp. Side sleeping  was another consideration. Goal was to see if her sleepiness improves with treatment.     On today's visit,         Sleep schedule:    In bed: 10PM  Activities in bed:  Bedtime Activities: TV on  Subjective sleep latency:  by11-11:30PM  Awakenings during night: 1-2x  Length of awakenings: 30-45 min  Final awakening time: 4:30-5A  Out of bed: 6AM with alarm clock  Overall estimate of total sleep time: 5-6h    Weekends: similar  Preferred sleeping position: supine and sidelying  Typically sleeps alone.     She denies sleep paralysis, cataplexy, or hypnopompic hallucinations.  Some hearing of squeaks in middle of the night.      Daytime Symptoms  On awakening patient reports: wake unrefreshed and feels sleepy    Daytime: During the day, she does not doze unintentionally while inactive.  During more active tasks, she never is drowsy.  With regards to daytime napping, the patient reports sometimes taking naps. If she takes a nap, typically she awakens unrefreshed.  She does not report that poor sleep results in mood-related irritability. She does not report that poor sleep results in issues with memory/concentration.    ESS: 18  NORM: 22  FOSQ: 14      REVIEW OF SYSTEMS     REVIEW OF SYSTEMS  Review of Systems   All other systems reviewed and are negative.        ALLERGIES AND MEDICATIONS     ALLERGIES  Allergies   Allergen Reactions    Lisinopril Cough       MEDICATIONS: She has a current medication list which includes the following prescription(s): albuterol - Take 3 mL (2.5 mg) by nebulization every 6 hours if needed for wheezing, alpha lipoic acid - Take 2 capsules by mouth 3 times a day, amlodipine - Take 1 tablet (5 mg) by mouth once daily, aspirin - TAKE 1 TABLET BY MOUTH EVERY DAY, atorvastatin - TAKE 1 TABLET BY MOUTH EVERY DAY, bupropion xl - Take 1 tablet (150 mg) by mouth once daily in the morning, celecoxib - Take 1 capsule (200 mg) by mouth 2 times a day, clonazepam - Take 1 tablet (0.5 mg) by mouth once  daily as needed, coenzyme q-10 - TAKE 1 CAPSULE BY MOUTH THREE TIMES A DAY, estradiol - Insert 0.25 Applicatorfuls (1 g) into the vagina 3 (three) times a week, fluoxetine - TAKE 1 CAPSULE BY MOUTH ONCE DAILY, fluticasone - Administer 1 spray into each nostril once daily, gabapentin - TAKE 1 CAPSULE BY MOUTH THREE TIMES A DAY, hydroxyzine hcl - Take 1 tablet (50 mg) by mouth 3 times a day as needed for anxiety, ibuprofen - TAKE 1 TABLET (800 MG) BY MOUTH 3 TIMES A DAY, levothyroxine - TAKE 1 TABLET BY MOUTH EVERY DAY, lorazepam - Take 1 p.o. 1 hour before the procedure, lorazepam - Take 1 p.o. 1 hour before the procedure, losartan - Take 1 tablet (100 mg) by mouth once daily, nicotine - Place 1 patch over 24 hours on the skin once every 24 hours, nicotine polacrilex - Chew 1 each (4 mg) if needed for smoking cessation, omeprazole - Take 1 capsule (20 mg) by mouth once daily in the morning. Take before meals. Do not crush or chew, polyethylene glycol-electrolytes - Take by mouth, solifenacin - Take 1 tablet (5 mg) by mouth once daily, sumatriptan - Take 1 tablet (50 mg) by mouth.  FOR MIGRAINE RELIEF. MAY REPEAT EVERY 2 HOURS. MAX 200MG/DAY, trazodone - Take 0.5-1 tablets (25-50 mg) by mouth as needed at bedtime, vit b comp/folic/choline/inosi - Take 1 tablet by mouth 2 times a day, vitamins b complex - TAKE 1 TABLET BY MOUTH TWICE A DAY, and [DISCONTINUED] amitriptyline - TAKE 1 TABLET BY MOUTH AT BEDTIME FOR MIGRAINE PREVENTION.    PAST MEDICAL HISTORY : She has Cardiomyopathy; Generalized anxiety disorder; Hyperlipidemia; Hypokalemia; Hypothyroidism; Incomplete emptying of bladder; Post-menopausal bleeding; Rotator cuff tendinitis, left; Rotator cuff tendinitis, right; Dyspnea on exertion; Shortness of breath; Tobacco dependence due to cigarettes; Vaginal atrophy; Vaginal discomfort; Vitamin D deficiency; Cervicalgia; Chronic pain; Class 1 obesity with body mass index (BMI) of 31.0 to 31.9 in adult; Elevated liver  enzymes; Fibromyalgia; Hip osteoarthritis; Insomnia; Lumbar foraminal stenosis; Lumbar radiculopathy; Multilevel degenerative disc disease; Myalgia; JYOTI (obstructive sleep apnea); Primary osteoarthritis of right hip; Sebaceous cyst; Segmental and somatic dysfunction of cervical region; Segmental and somatic dysfunction of lumbar region; Segmental and somatic dysfunction of pelvic region; Segmental and somatic dysfunction of thoracic region; Aftercare following joint replacement surgery; Other hyperlipidemia; Long term (current) use of aspirin; History of drug use; Nicotine dependence; Presence of left artificial hip joint; Abnormal glandular Papanicolaou smear of cervix; Alcohol abuse; Anxiety; Cardiac murmur; Complicated grief; Disc displacement, thoracic; Displacement of lumbar intervertebral disc without myelopathy; Female infertility; GERD (gastroesophageal reflux disease); Graves disease; Hallux valgus, acquired; History of Graves' disease; Lumbar pain; Mild episode of recurrent major depressive disorder (CMS-HCC); Neck sprain; Right knee pain; Sickle-cell trait (CMS-HCC); Tobacco use disorder; Lumbar sprain; PMB (postmenopausal bleeding); Irregular periods; and Iliotibial band syndrome, left on their problem list.    PAST SURGICAL HISTORY: She  has a past surgical history that includes Other surgical history (10/28/2020) and Other surgical history (01/04/2022).     FAMILY HISTORY: No changes since previous visit. Otherwise non-contributory as charted.     SOCIAL HISTORY  She  reports that she has been smoking cigarettes. She has a 17.5 pack-year smoking history. She has been exposed to tobacco smoke. She has never used smokeless tobacco. She reports current alcohol use of about 1.0 standard drink of alcohol per week. She reports current drug use. Drug: Marijuana.  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      PHYSICAL EXAM     Physical Examination: LMP  (LMP Unknown)     PREVIOUS WEIGHTS:  Wt Readings from  Last 3 Encounters:   11/11/24 72 kg (158 lb 11.7 oz)   11/01/24 73.9 kg (163 lb)   09/10/24 73.9 kg (163 lb)       General: The patient is a pleasant female, in no acute distress. HEENT: She has a  a modified Mallampati grade 2 airway with Numbers; 0-4 (with +): 1+ tonsils bilaterally. The soft palate was normal and the uvula was normal. The A/P diameter of the velopharynx was not narrowed. The oropharynx was not shallow in the A/P diameter. Lateral wall narrowing was present. Tongue ridging waspresent. Erythema of the posterior pharynx was not present. Mucosal hypertrophy in the posterior oropharynx was not present. Retrognathia was not and micrognathia was not present. Neck: The neck was not enlarged. No JVP, bruits or lymphadenopathy was appreciated. Chest: Clear to auscultation. No wheezes, rales, or rhonchi. Cardiovascular: Regular rate and rhythm. No murmurs, gallops, or clicks. Abdomen: Soft, nontender, nondistended. Positive bowel sounds. Extremities: No clubbing, cyanosis, or edema is noted. Neurologic exam: Alert, oriented x3 and was grossly non-focal.      RESULTS/DATA     Iron (ug/dL)   Date Value   05/16/2023 92     Iron Saturation (%)   Date Value   05/16/2023 20 (L)     TIBC (ug/dL)   Date Value   05/16/2023 457 (H)     Ferritin (ug/L)   Date Value   05/16/2023 150       Bicarbonate (mmol/L)   Date Value   11/01/2024 24   02/13/2024 24   05/03/2023 24   02/25/2022 26   01/14/2022 23       PAP Adherence  A PAP adherence download was obtained and data was reviewed personally today in clinic. (see scanned document in EPIC)      DIAGNOSES     Problem List and Orders  There are no diagnoses linked to this encounter.        ASSESSMENT/PLAN     Ms. Flores is a 54 y.o. female and with past medical history significant for HTN, chronic pain, fibtromyalgia, back pain, hypothroidism, ARIANNA.  She returns in followup to  the [facility] for their sleep apnea.    # JYOTI  -She has mild to  moderate JYOTI based on her sleep testing - mostly REM-related JYOTI  - Her sleep apnea is more normalized with side sleeping  - Plan a trial of APAP at 4-12 cwp - adjustments to her old machine made  - Gave her a N20 medium mask sample to try  - If that does not work then we can think about alternatives - such as side sleeping - gave examples of sleep positioners  - See if treatment of JYOTI improves her excessive sleepiness    # Depression/Anxiety disorder  - this will play a role in her insomnia and fatigue  - addressing this will be important independent of sleep-related issues     #Chronic insomnia  -Unclear if some of this is related to her untreated sleep apnea  -We will see if this improves once she starts CPAP again or if it gets worse  -Addressing this with her mental health team will be helpful    # OBESITY: We also discussed that obesity is a risk factor for sleep apnea. Weight loss over time could be beneficial from the standpoint for sleep apnea and other health conditions and improving with weight loss.     Follow up: 3-4 months         Shandra Woods MD PhD

## 2024-12-04 ENCOUNTER — APPOINTMENT (OUTPATIENT)
Dept: SLEEP MEDICINE | Facility: CLINIC | Age: 54
End: 2024-12-04
Payer: COMMERCIAL

## 2024-12-09 ENCOUNTER — APPOINTMENT (OUTPATIENT)
Dept: PRIMARY CARE | Facility: CLINIC | Age: 54
End: 2024-12-09
Payer: COMMERCIAL

## 2024-12-09 DIAGNOSIS — M79.7 FIBROMYALGIA: Primary | ICD-10-CM

## 2024-12-09 PROCEDURE — 99212 OFFICE O/P EST SF 10 MIN: CPT | Performed by: NURSE PRACTITIONER

## 2024-12-09 NOTE — PROGRESS NOTES
An interactive audio and video telecommunication system which permits real time communications between the patient (at the originating site) and provider (at the distant site) was utilized to provide this telehealth service.    Verbal consent was requested and obtained from Miguel Angel Flores  on this date, 12/09/24  , for a telehealth visit.    Subjective   Patient ID: Miguel Angel Flores is a 54 y.o. female who presents virtually requesting disability papers to be completed for her fibromyalgia. I have had no part in treating this diagnosis for her and recommended she discuss further with PM. She would also need functional capacity testing which I put in referral for.     HPI    grief, anxiety, depression: followed by psych right now, weaned off Wellbutrin 150 mg and Lexapro 20 mg over the past few weeks, felt she no longer needed meds and liver enzymes improved. She has felt more anxious and depressed, restarted wellbutrin. She lost her brother 12/19/21. She lost her mother and brother in 1986 and did not respond well to the grief, she wound up abusing drugs to treat the pain.      hx PE: completed Eliquis. Provoked PE from left total hip replacement with Dr. Dillan Greco 6/28/21 at [facility]. She developed SOB and flank pain and went to ER- found to have PE LLL.   HTN, hx cardiomyopathy: followed by Dr. Wilhelm. On losartan 100 mg QD for HTN; did not tolerate Lisinopril r/t ACE cough. amlodipine 5 mg QD. Did not tolerate BB due to baseline bradycardia. Recent echo showed reduced ejection fraction. She is not checking BP at home.   GYN: followed by Dr. Gay. Pap UTD 02/2021. OAB: followed by Avani Ta; on solifenacin succinate 5 mg every day. Using estrace vaginal cream.   hypothyroidism: Graves disease, s/p thyroidectomy in ~2000 at [facility]. levothyroxine 125 mcg QD.   OA R hip: s/p hip replacement L hip. Followed by Kwame Suh. She is doing PT. Seeing pain management tomorrow  lumbar b/l  radiculopathy:  responds to aqua therapy, acupuncture. epidural steroid injections, and surgery down the line if no improvement with conservative measures. She will be seeing PM. She is taking gabapentin 300 mg 2 pills QHS PRN; which she thinks helps with nerve pain. pain did not improve with meloxicam and methocarbamol. She tried ketamine infusions for fibromyalgia with PM. Recently started Celebrex for arthritis, Ok'd by cardiologist. Also started on fluoxetine for hot flashes. Celebrex has helped quite a bit.  JYOTI: pt states she was tested and never started using cpap, will need repeat testing.      SOCIAL: She is living at home with 2 adopted children twin 12 year old girls, fostered at 10 y/o. 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 [employer] dog at home.   no alcohol use.   tobacco use: 1/2 ppd 10 cigarettes for 35 years. She has tried Chantix with no success.   Review of Systems  Objective   LMP  (LMP Unknown)     Physical Exam  Assessment/Plan   Diagnoses and all orders for this visit:  Fibromyalgia: functional capacity testing referral ordered. Pt will schedule.     Tobacco dependence due to cigarettes  -     nicotine (Nicoderm CQ) 14 mg/24 hr patch; Place 1 patch over 24 hours on the skin once every 24 hours.  -     nicotine (Nicoderm CQ) 7 mg/24 hr patch; Place 1 patch over 24 hours on the skin once every 24 hours.  -     nicotine polacrilex (Nicorette) 4 mg gum; Chew 1 each (4 mg) if needed for smoking cessation.    migraines: imitrex PRN.   urinary incontinence: continue solifenacin succinate 5 mg QD per urogyn   hyperlipidemia: stable, continue atorvastatin 10 mg every day, lipid panel ordered   HTN: stable, continue amlodipine 5 mg QD, losartan 100 mg QD  hypothyroidism: levothyroxine to 125 mcg every day, TSH ordered.   vaginal irritation, OAB: continue estradiol cream per GYN    health maintenance: paps WNL 02/2021. mammogram due 05/2024.  colonoscopy ordered.   CPE due 2/7/2025  Patient verbalized understanding and agree to plan of care.  Instructed to call or schedule appointment if any changes or as needed.

## 2024-12-13 ENCOUNTER — APPOINTMENT (OUTPATIENT)
Dept: URGENT CARE | Age: 54
End: 2024-12-13
Payer: COMMERCIAL

## 2024-12-13 ENCOUNTER — HOSPITAL ENCOUNTER (EMERGENCY)
Facility: HOSPITAL | Age: 54
Discharge: HOME | End: 2024-12-13
Payer: COMMERCIAL

## 2024-12-13 VITALS
WEIGHT: 158 LBS | SYSTOLIC BLOOD PRESSURE: 123 MMHG | OXYGEN SATURATION: 94 % | TEMPERATURE: 97.3 F | HEIGHT: 62 IN | HEART RATE: 67 BPM | DIASTOLIC BLOOD PRESSURE: 82 MMHG | RESPIRATION RATE: 18 BRPM | BODY MASS INDEX: 29.08 KG/M2

## 2024-12-13 DIAGNOSIS — H60.501 ACUTE OTITIS EXTERNA OF RIGHT EAR, UNSPECIFIED TYPE: Primary | ICD-10-CM

## 2024-12-13 LAB
FLUAV RNA RESP QL NAA+PROBE: NOT DETECTED
FLUBV RNA RESP QL NAA+PROBE: NOT DETECTED
SARS-COV-2 RNA RESP QL NAA+PROBE: NOT DETECTED

## 2024-12-13 PROCEDURE — 87636 SARSCOV2 & INF A&B AMP PRB: CPT | Performed by: EMERGENCY MEDICINE

## 2024-12-13 PROCEDURE — 99283 EMERGENCY DEPT VISIT LOW MDM: CPT

## 2024-12-13 PROCEDURE — 2500000001 HC RX 250 WO HCPCS SELF ADMINISTERED DRUGS (ALT 637 FOR MEDICARE OP): Performed by: NURSE PRACTITIONER

## 2024-12-13 PROCEDURE — 2500000002 HC RX 250 W HCPCS SELF ADMINISTERED DRUGS (ALT 637 FOR MEDICARE OP, ALT 636 FOR OP/ED): Performed by: NURSE PRACTITIONER

## 2024-12-13 RX ORDER — NAPROXEN 500 MG/1
500 TABLET ORAL
Qty: 20 TABLET | Refills: 0 | Status: SHIPPED | OUTPATIENT
Start: 2024-12-13 | End: 2024-12-23

## 2024-12-13 RX ORDER — AMOXICILLIN 500 MG/1
1000 TABLET, FILM COATED ORAL DAILY
Qty: 20 TABLET | Refills: 0 | Status: SHIPPED | OUTPATIENT
Start: 2024-12-13 | End: 2024-12-23

## 2024-12-13 RX ORDER — CIPROFLOXACIN AND DEXAMETHASONE 3; 1 MG/ML; MG/ML
4 SUSPENSION/ DROPS AURICULAR (OTIC) ONCE
Status: COMPLETED | OUTPATIENT
Start: 2024-12-13 | End: 2024-12-13

## 2024-12-13 RX ORDER — NAPROXEN 500 MG/1
500 TABLET ORAL ONCE
Status: DISCONTINUED | OUTPATIENT
Start: 2024-12-13 | End: 2024-12-13 | Stop reason: HOSPADM

## 2024-12-13 RX ORDER — CIPROFLOXACIN AND DEXAMETHASONE 3; 1 MG/ML; MG/ML
4 SUSPENSION/ DROPS AURICULAR (OTIC) 2 TIMES DAILY
Qty: 7.5 ML | Refills: 0 | Status: SHIPPED | OUTPATIENT
Start: 2024-12-13 | End: 2024-12-20

## 2024-12-13 RX ORDER — AMOXICILLIN 500 MG/1
500 CAPSULE ORAL ONCE
Status: COMPLETED | OUTPATIENT
Start: 2024-12-13 | End: 2024-12-13

## 2024-12-13 ASSESSMENT — PAIN SCALES - GENERAL: PAINLEVEL_OUTOF10: 10 - WORST POSSIBLE PAIN

## 2024-12-13 ASSESSMENT — PAIN DESCRIPTION - LOCATION: LOCATION: EAR

## 2024-12-13 ASSESSMENT — PAIN - FUNCTIONAL ASSESSMENT: PAIN_FUNCTIONAL_ASSESSMENT: 0-10

## 2024-12-13 ASSESSMENT — PAIN DESCRIPTION - ORIENTATION: ORIENTATION: RIGHT

## 2024-12-13 NOTE — ED TRIAGE NOTES
Pt c/o earache x2 days; pt rpeorts during day ear started bleeding, generalized body aches, cough x3 weeks.

## 2024-12-13 NOTE — ED PROVIDER NOTES
HPI   Chief Complaint   Patient presents with    Earache       54-year-old female presents today with right otalgia.  It started yesterday.  She noticed a little bleeding on the exterior aspect of her ear canal.  She did not stick anything in her ear.  She rates the pain 8 out of 10.  She denies headache.  She denies epistaxis.  She denies hemoptysis.  She denies nausea or vomiting.  She has a history of fibromyalgia.  The prescriptive reporting authority has an overdose score of 320 and she has received 9 different prescriptions from 6 different providers.  Most of her prescriptions are for anxiety.  Patient notes that her furnace was out for 2 weeks until the landlord finally got it restarted in the last couple days.  Patient was diagnosed with COVID November 1 and she noted that her niece and her 1-year-old were both ill with colds during the week.  She believes she has had some complications from COVID.  She endorses coughing but it has completely cleared up since Thanksgiving.  She was placed on doxycycline.  She denies vision change.  She denies headache.  She denies sinus congestion.  She denies pharyngitis.  She denies skin rash.  Her only allergy is lisinopril with cough.      History provided by:  Patient   used: No            Patient History   Past Medical History:   Diagnosis Date    Abdominal pain, right lower quadrant 06/27/2006    Formatting of this note might be different from the original. Chronic intemittent since 10-05    Cardiomyopathy     Fibromyalgia 2023    Hyperthyroidism     Primary osteoarthritis of left hip 07/12/2019    Formatting of this note might be different from the original. Added automatically from request for surgery [id number]     Past Surgical History:   Procedure Laterality Date    OTHER SURGICAL HISTORY  10/28/2020    Ectopic pregnancy removal    OTHER SURGICAL HISTORY  01/04/2022    Hip replacement     Family History   Problem Relation Name Age of Onset     Cardiomyopathy Father      Prostate cancer Father      Heart attack Father      Diabetes Maternal Grandmother      Breast cancer Paternal Grandmother      Diabetes Mother's Sister       Social History     Tobacco Use    Smoking status: Every Day     Current packs/day: 0.50     Average packs/day: 0.5 packs/day for 35.0 years (17.5 ttl pk-yrs)     Types: Cigarettes     Passive exposure: Current    Smokeless tobacco: Never   Vaping Use    Vaping status: Never Used   Substance Use Topics    Alcohol use: Yes     Alcohol/week: 1.0 standard drink of alcohol     Types: 1 Glasses of wine per week    Drug use: Yes     Types: Marijuana     Comment: Occ Marijuana       Physical Exam   ED Triage Vitals [12/13/24 1744]   Temperature Heart Rate Respirations BP   36.3 °C (97.3 °F) 67 18 123/82      Pulse Ox Temp src Heart Rate Source Patient Position   94 % -- Monitor --      BP Location FiO2 (%)     -- --       Physical Exam  Constitutional:       Appearance: Normal appearance.   HENT:      Head: Normocephalic and atraumatic.      Left Ear: Tympanic membrane normal.      Ears:      Comments: Blood at the external ear canal and it is erythematous and tender to the touch     Nose: Nose normal.      Mouth/Throat:      Mouth: Mucous membranes are moist.   Eyes:      Extraocular Movements: Extraocular movements intact.      Pupils: Pupils are equal, round, and reactive to light.   Cardiovascular:      Rate and Rhythm: Normal rate and regular rhythm.      Pulses: Normal pulses.      Heart sounds: Normal heart sounds.   Pulmonary:      Effort: Pulmonary effort is normal.      Breath sounds: Normal breath sounds.   Abdominal:      General: Abdomen is flat.      Palpations: Abdomen is soft.   Musculoskeletal:         General: Normal range of motion.      Cervical back: Normal range of motion and neck supple.   Skin:     General: Skin is warm.      Capillary Refill: Capillary refill takes less than 2 seconds.   Neurological:      General:  No focal deficit present.      Mental Status: She is alert and oriented to person, place, and time.   Psychiatric:         Mood and Affect: Mood normal.         Behavior: Behavior normal.           ED Course & MDM   Diagnoses as of 12/13/24 1845   Acute otitis externa of right ear, unspecified type                 No data recorded     [place] Coma Scale Score: 15 (12/13/24 1746 : Jocelynn Saini RN)                           Medical Decision Making  There was blood at the external ear canal.  She received Ciprodex for pain as it as a steroid and Naprosyn.  She was referred to ENT and I recommended outpatient follow-up as soon as possible.  She was placed on the Ciprodex and amoxicillin for 10 days.  This should help clear up infection but return precautions were reviewed.  There was no neurologic deficit remaining physical exam was normal.  Safely discharged home with return precautions.        Procedure  Procedures     Jerman Akhtar, RILEY-CNP  12/13/24 1845

## 2024-12-18 ENCOUNTER — APPOINTMENT (OUTPATIENT)
Dept: OTOLARYNGOLOGY | Facility: CLINIC | Age: 54
End: 2024-12-18
Payer: COMMERCIAL

## 2024-12-20 ENCOUNTER — OFFICE VISIT (OUTPATIENT)
Dept: OTOLARYNGOLOGY | Facility: CLINIC | Age: 54
End: 2024-12-20
Payer: COMMERCIAL

## 2024-12-20 VITALS — HEIGHT: 62 IN | BODY MASS INDEX: 28.34 KG/M2 | WEIGHT: 154 LBS | TEMPERATURE: 97.8 F

## 2024-12-20 DIAGNOSIS — H91.91 HEARING DIFFICULTY OF RIGHT EAR: ICD-10-CM

## 2024-12-20 DIAGNOSIS — H73.011 BULLOUS MYRINGITIS OF RIGHT EAR: Primary | ICD-10-CM

## 2024-12-20 DIAGNOSIS — H92.01 RIGHT EAR PAIN: ICD-10-CM

## 2024-12-20 DIAGNOSIS — H93.8X1 SENSATION OF FULLNESS IN RIGHT EAR: ICD-10-CM

## 2024-12-20 PROCEDURE — 3008F BODY MASS INDEX DOCD: CPT | Performed by: NURSE PRACTITIONER

## 2024-12-20 PROCEDURE — 99204 OFFICE O/P NEW MOD 45 MIN: CPT | Performed by: NURSE PRACTITIONER

## 2024-12-20 RX ORDER — AZITHROMYCIN 250 MG/1
TABLET, FILM COATED ORAL
Qty: 6 TABLET | Refills: 0 | Status: SHIPPED | OUTPATIENT
Start: 2024-12-20 | End: 2024-12-25

## 2024-12-20 ASSESSMENT — PATIENT HEALTH QUESTIONNAIRE - PHQ9
SUM OF ALL RESPONSES TO PHQ9 QUESTIONS 1 AND 2: 0
2. FEELING DOWN, DEPRESSED OR HOPELESS: NOT AT ALL
1. LITTLE INTEREST OR PLEASURE IN DOING THINGS: NOT AT ALL

## 2024-12-20 NOTE — H&P (VIEW-ONLY)
Subjective   Patient ID: Miguel Angel Flores is a 54 y.o. female who presents for Follow-up (From ED for bleeding and pain in right ear).  HPI  The patient is referred for evaluation of a right sided ear infection.  When asked about ear pain, hearing loss, discharge from ear, tinnitus, aural fullness or autophony in the affected ear or ears, the patient admits to sudden onset of right-sided otalgia, fullness, hearing difficulty.  She was evaluated in the ED 1 week ago and started on amoxicillin and Ciprodex drops.  Since then, pain has significantly improved, but she has occasional painful sensations as well as ongoing fullness and hearing difficulty.  She was noted to have blood in the right ear canal when evaluated in the ED.  When asked about a significant past otological history including history of prior ear surgery, noise exposure, exposure to ototoxic drugs or agents, and/or family history of hearing loss, the patient admits to none.    Review of Systems  A comprehensive or 10 points review of the patient's constitutional, neurological, HEENT, pulmonary, cardiovascular and genito-urinary systems showed only those mentioned in history of present illness.    Objective   Physical Exam  Constitutional: no fever, chills, weight loss or weight gain   General appearance: Appears well, well-nourished, well groomed. No acute distress.   Communication: Normal communication   Psychiatric: Oriented to person, place and time. Normal mood and affect.   Neurologic: Cranial nerves II-XII grossly intact and symmetric bilaterally.   Head and Face:   Head: Atraumatic with no masses, lesions or scarring.   Face: Normal symmetry, no paralysis, synkinesis or facial tic. No scars or deformities.     Eyes: Conjunctiva not edematous or erythematous   Ears: External inspection of ears with no deformity, scars or masses.  Both ears were examined under the microscope.  Right canal essentially clear.  There is crusting of yellow debris and  dried blood to the right TM consistent with a resolving bullous myringitis.  Left canal clear.  TM intact.  No effusion or retraction noted.     Neck: Normal appearing, symmetric, trachea midline.   Cardiovascular: Examination of peripheral vascular system shows no clubbing or cyanosis.   Respiratory: No respiratory distress increased work of breathing. Inspection of the chest with symmetric chest expansion and normal respiratory effort.   Skin: No rashes in the head or neck    Assessment/Plan        This patient presents for initial evaluation of acute acquired right bullous myringitis, right aural fullness, right otalgia, right hearing difficulty.    Reassurance given that this appears to be a resolving bullous myringitis.  I did not recommend removing the debris/dried blood off the TM at this point for fear of causing a TM perforation.  I recommended she complete a 10-day course of Ciprodex drops and a Z-Lopez.  I would like to see her back in 2 to 3 weeks for repeat exam.  I asked that she use oil-based drops x 3 days prior to that visit to loosen the debris adherent to the TM.  Patient is in agreement with the plan.  All questions were answered to patient's satisfaction.    This note was created using speech recognition transcription software. Despite proofreading, several typographical errors might be present that might affect the meaning of the content. Please call with any questions.      RILEY Gonzales-CNP 12/20/24 12:34 PM

## 2025-01-03 ENCOUNTER — APPOINTMENT (OUTPATIENT)
Dept: OTOLARYNGOLOGY | Facility: CLINIC | Age: 55
End: 2025-01-03
Payer: COMMERCIAL

## 2025-01-03 DIAGNOSIS — H73.011 BULLOUS MYRINGITIS OF RIGHT EAR: ICD-10-CM

## 2025-01-03 DIAGNOSIS — H61.21 IMPACTED CERUMEN OF RIGHT EAR: Primary | ICD-10-CM

## 2025-01-03 PROCEDURE — 99213 OFFICE O/P EST LOW 20 MIN: CPT | Performed by: NURSE PRACTITIONER

## 2025-01-03 ASSESSMENT — PATIENT HEALTH QUESTIONNAIRE - PHQ9
2. FEELING DOWN, DEPRESSED OR HOPELESS: NOT AT ALL
SUM OF ALL RESPONSES TO PHQ9 QUESTIONS 1 AND 2: 0
1. LITTLE INTEREST OR PLEASURE IN DOING THINGS: NOT AT ALL

## 2025-01-03 NOTE — PROGRESS NOTES
Subjective   Patient ID: Miguel Angel Flores is a 54 y.o. female who presents for Follow-up (Ear infection).  HPI  This patient presents for a follow-up visit.  In review, she initially saw me 12/20/2024 for right sided aural fullness, otalgia, hearing difficulty secondary to bullous myringitis.  She has completed a Z-Lopez and Ciprodex drops.  Today, she reports that the pain has resolved but she continues to have diminished hearing on the right side.  She has been using oil-based drops for the past 3 nights.  Review of Systems  A comprehensive or 10 points review of the patient's constitutional, neurological, HEENT, pulmonary, cardiovascular and genito-urinary systems showed only those mentioned in history of present illness.    Objective   Physical Exam  Constitutional: no fever, chills, weight loss or weight gain   General appearance: Appears well, well-nourished, well groomed. No acute distress.   Communication: Normal communication   Psychiatric: Oriented to person, place and time. Normal mood and affect.   Neurologic: Cranial nerves II-XII grossly intact and symmetric bilaterally.   Head and Face:   Head: Atraumatic with no masses, lesions or scarring.   Face: Normal symmetry, no paralysis, synkinesis or facial tic. No scars or deformities.     Eyes: Conjunctiva not edematous or erythematous   Ears: External inspection of ears with no deformity, scars or masses.  Right canal essentially clear with thick cast of dried blood/debris adherent to the TM.  Left canal clear.  TM intact.  No effusion or retraction noted.     Neck: Normal appearing, symmetric, trachea midline.   Cardiovascular: Examination of peripheral vascular system shows no clubbing or cyanosis.   Respiratory: No respiratory distress increased work of breathing. Inspection of the chest with symmetric chest expansion and normal respiratory effort.   Skin: No rashes in the head or neck    Assessment/Plan        This patient presents for subsequent  evaluation of acute acquired right-sided cerumen impaction secondary to resolving bullous myringitis.    Reassurance given that her exam has significantly improved.  However, I was not able to remove the cast of dried blood due to patient comfort.  I recommended continuing with oil-based drops and returning next week.  Patient is in agreement with the plan.  All questions were answered to patient's satisfaction.    This note was created using speech recognition transcription software. Despite proofreading, several typographical errors might be present that might affect the meaning of the content. Please call with any questions.  Patient ID: Miguel Angel Flores is a 54 y.o. female.    Ear cerumen removal    Date/Time: 1/3/2025 9:59 AM    Performed by: CARLA Gonzales  Authorized by: CARLA Gonzales    Consent:     Consent obtained:  Verbal    Consent given by:  Patient    Risks discussed:  Pain    Alternatives discussed:  No treatment  Procedure details:     Location:  R ear    Procedure type comment:  Right angle hook    Procedure outcomes: unable to remove cerumen    Post-procedure details:     Inspection:  TM intact    Hearing quality:  Diminished    Procedure completion:  Procedure terminated electively by provider  Comments:      Thick cast of dried blood/debris adherent to the central right TM.  I was able to lift an edge, but due to patient comfort, cleaning was discontinued.      CARLA Gonzales 01/03/25 9:57 AM

## 2025-01-07 ENCOUNTER — HOSPITAL ENCOUNTER (OUTPATIENT)
Dept: PAIN MEDICINE | Facility: CLINIC | Age: 55
Discharge: HOME | End: 2025-01-07
Payer: COMMERCIAL

## 2025-01-07 VITALS
BODY MASS INDEX: 28.17 KG/M2 | DIASTOLIC BLOOD PRESSURE: 70 MMHG | SYSTOLIC BLOOD PRESSURE: 121 MMHG | OXYGEN SATURATION: 96 % | RESPIRATION RATE: 16 BRPM | TEMPERATURE: 97.5 F | WEIGHT: 154 LBS | HEART RATE: 61 BPM

## 2025-01-07 DIAGNOSIS — M16.10 HIP ARTHRITIS: ICD-10-CM

## 2025-01-07 PROCEDURE — 77002 NEEDLE LOCALIZATION BY XRAY: CPT | Performed by: ANESTHESIOLOGY

## 2025-01-07 PROCEDURE — 2550000001 HC RX 255 CONTRASTS: Performed by: ANESTHESIOLOGY

## 2025-01-07 PROCEDURE — 20610 DRAIN/INJ JOINT/BURSA W/O US: CPT | Performed by: ANESTHESIOLOGY

## 2025-01-07 PROCEDURE — 2500000004 HC RX 250 GENERAL PHARMACY W/ HCPCS (ALT 636 FOR OP/ED): Performed by: ANESTHESIOLOGY

## 2025-01-07 RX ORDER — BUPIVACAINE HYDROCHLORIDE 7.5 MG/ML
INJECTION, SOLUTION EPIDURAL; RETROBULBAR AS NEEDED
Status: COMPLETED | OUTPATIENT
Start: 2025-01-07 | End: 2025-01-07

## 2025-01-07 RX ORDER — TRIAMCINOLONE ACETONIDE 40 MG/ML
INJECTION, SUSPENSION INTRA-ARTICULAR; INTRAMUSCULAR AS NEEDED
Status: COMPLETED | OUTPATIENT
Start: 2025-01-07 | End: 2025-01-07

## 2025-01-07 RX ORDER — LIDOCAINE HYDROCHLORIDE 5 MG/ML
INJECTION, SOLUTION INFILTRATION; INTRAVENOUS AS NEEDED
Status: COMPLETED | OUTPATIENT
Start: 2025-01-07 | End: 2025-01-07

## 2025-01-07 RX ADMIN — BUPIVACAINE HYDROCHLORIDE 10 ML: 7.5 INJECTION, SOLUTION EPIDURAL; RETROBULBAR at 13:16

## 2025-01-07 RX ADMIN — LIDOCAINE HYDROCHLORIDE 10 ML: 5 INJECTION, SOLUTION INFILTRATION at 13:16

## 2025-01-07 RX ADMIN — IOHEXOL 3 ML: 300 INJECTION, SOLUTION INTRAVENOUS at 13:16

## 2025-01-07 RX ADMIN — TRIAMCINOLONE ACETONIDE 40 MG: 40 INJECTION, SUSPENSION INTRA-ARTICULAR; INTRAMUSCULAR at 13:16

## 2025-01-07 ASSESSMENT — ENCOUNTER SYMPTOMS
OCCASIONAL FEELINGS OF UNSTEADINESS: 1
DEPRESSION: 1
LOSS OF SENSATION IN FEET: 0

## 2025-01-07 ASSESSMENT — PAIN - FUNCTIONAL ASSESSMENT: PAIN_FUNCTIONAL_ASSESSMENT: 0-10

## 2025-01-07 ASSESSMENT — PAIN SCALES - GENERAL: PAINLEVEL_OUTOF10: 10 - WORST POSSIBLE PAIN

## 2025-01-08 ENCOUNTER — APPOINTMENT (OUTPATIENT)
Dept: OTOLARYNGOLOGY | Facility: CLINIC | Age: 55
End: 2025-01-08
Payer: COMMERCIAL

## 2025-01-08 VITALS — WEIGHT: 154 LBS | HEIGHT: 62 IN | BODY MASS INDEX: 28.34 KG/M2

## 2025-01-08 DIAGNOSIS — H73.011 BULLOUS MYRINGITIS OF RIGHT EAR: Primary | ICD-10-CM

## 2025-01-08 PROCEDURE — 3008F BODY MASS INDEX DOCD: CPT | Performed by: NURSE PRACTITIONER

## 2025-01-08 PROCEDURE — 99212 OFFICE O/P EST SF 10 MIN: CPT | Performed by: NURSE PRACTITIONER

## 2025-01-08 NOTE — PROGRESS NOTES
Subjective   Patient ID: Miguel Angel Flores is a 54 y.o. female who presents for Otitis Media.  HPI  This patient presents for a 1 week follow-up for recent bullous myringitis.  At her last visit, her infection appeared to have completely resolved but she still had a cast of dried blood adherent to the TM.  Due to patient comfort, removing this was discontinued.  She has been using oil-based drops for the past several days.  She continues to report right fullness and muffled hearing but reports that the otalgia has resolved and her hearing loss did improve.  Review of Systems  A comprehensive or 10 points review of the patient's constitutional, neurological, HEENT, pulmonary, cardiovascular and genito-urinary systems showed only those mentioned in history of present illness.    Objective   Physical Exam  Constitutional: no fever, chills, weight loss or weight gain   General appearance: Appears well, well-nourished, well groomed. No acute distress.   Communication: Normal communication   Psychiatric: Oriented to person, place and time. Normal mood and affect.   Neurologic: Cranial nerves II-XII grossly intact and symmetric bilaterally.   Head and Face:   Head: Atraumatic with no masses, lesions or scarring.   Face: Normal symmetry, no paralysis, synkinesis or facial tic. No scars or deformities.     Eyes: Conjunctiva not edematous or erythematous   Ears: External inspection of ears with no deformity, scars or masses.  Right canal essentially clear.  A large cast of dried blood is still adherent to the central aspect of the TM which is now flush with the drum.  No removal was attempted.  Left canal clear.  TM intact.  No effusion or retraction noted.     Neck: Normal appearing, symmetric, trachea midline.   Cardiovascular: Examination of peripheral vascular system shows no clubbing or cyanosis.   Respiratory: No respiratory distress increased work of breathing. Inspection of the chest with symmetric chest expansion and  normal respiratory effort.   Skin: No rashes in the head or neck    Assessment/Plan     This patient presents for subsequent evaluation of acute right bullous myringitis.    We discussed that the dried blood adherent to her TM is likely a scab.  I did not recommend removing this for fear of causing a TM perforation.  I recommended she follow-up in 1 month and repeat oil-based drops prior to that visit.  Patient is in agreement with the plan.  All questions were answered to patient's satisfaction.    This note was created using speech recognition transcription software. Despite proofreading, several typographical errors might be present that might affect the meaning of the content. Please call with any questions.         RILEY Gonzales-CNP 01/08/25 10:40 AM

## 2025-01-30 NOTE — PROGRESS NOTES
This is 54 y.o.  female with who has been treated for Hip arthritis . Pain is right 50 % better, however left hip is really painful . The pain is described as  constant deep ache   and is relieved by nothing . Here for follow-up   Chief Complaint   Patient presents with    Follow-up   4-week follow-up 1/7/2025 Joint Aspiration/Injection     Patient want to discuss having something for pain and muscle relaxer .   chiropractor care is not helping     Pain Therapies: Injections        Raymond Each Box that Applies Female Male   FAMILY HISTORY OF SUBSTANCE ABUSE  Raymond the boxes that applies   Alcohol ?  1    ? 3   Illegal drugs ?  2 ? 3   Rx drugs ?  4 ? 4   PERSONAL HISTORY OF SUBSTNACE ABUSE   Alcohol ?  3 ?  3   Illegal drugs ?  4 ?  4    Rx drugs ?  5 ?  5   Age Between 16-45 years ?  1 ?  1   History of Preadolescent Sexual Abuse ?  3 ?  0   PSYCHOLOGIC DISEASE   ADD, OCD, bipolar, schizophrenia ?  2 ?  2   Depression ?  1 ?  1   Scoring Totals 0      Scoring (Risk)  0-3 - Low  4-7 - Moderate  8 - High    Opioid Risk Assessment Score 0/26

## 2025-02-03 ENCOUNTER — APPOINTMENT (OUTPATIENT)
Dept: INTEGRATIVE MEDICINE | Facility: CLINIC | Age: 55
End: 2025-02-03
Payer: COMMERCIAL

## 2025-02-03 DIAGNOSIS — M54.2 CERVICALGIA: ICD-10-CM

## 2025-02-03 DIAGNOSIS — M16.11 OSTEOARTHRITIS OF RIGHT HIP, UNSPECIFIED OSTEOARTHRITIS TYPE: ICD-10-CM

## 2025-02-03 DIAGNOSIS — M99.03 SEGMENTAL AND SOMATIC DYSFUNCTION OF LUMBAR REGION: ICD-10-CM

## 2025-02-03 DIAGNOSIS — M54.17 LUMBOSACRAL RADICULITIS: ICD-10-CM

## 2025-02-03 DIAGNOSIS — M99.02 SEGMENTAL AND SOMATIC DYSFUNCTION OF THORACIC REGION: ICD-10-CM

## 2025-02-03 DIAGNOSIS — M99.01 SEGMENTAL DYSFUNCTION OF CERVICAL REGION: Primary | ICD-10-CM

## 2025-02-03 PROCEDURE — 98941 CHIROPRACT MANJ 3-4 REGIONS: CPT | Performed by: CHIROPRACTOR

## 2025-02-03 NOTE — PROGRESS NOTES
Subjective   Patient ID: Miguel Angel Dominguez is a 54 y.o. female who presents February 3, 2025 for neck/upper back and low back pain.    (1/15) V[facility]Y    Today, the patient rates their degree of pain as a 6 out of 10 on the numeric pain rating scale.     Miguel Angel presents to my office for care of neck and lower back pain. She is known to our practice as a patient of my colleague, Dr. Abraham. She reports that this past Saturday, she woke up with a tweak along the right side of her neck. She reports pain extending from the right side into the shoulders and scapular region. She notes her right shoulder is particularly bothersome, and she cannot lay on it for long periods of time. She has tried stretching and ice at home with some relief noted. She notes ongoing lower back pain. She has found chiropractic care helpful in the past. Notes she receives injections for R>L hip pain through pain management.      Objective   Physical Exam  Neurological:      General: No focal deficit present.      Mental Status: She is alert and oriented to person, place, and time.      Cranial Nerves: No dysarthria or facial asymmetry.      Sensory: Sensation is intact.      Motor: Motor function is intact.      Coordination: Coordination is intact.      Gait: Gait is intact. Gait normal.         Palpation of the following region(s) revealed:  Cervical: Suboccipitals right, muscular hypertonicity.  Upper trapezius right, hypertonicity and tenderness.  Levator scapulae right, hypertonicity and tenderness.  Cervical paraspinals right, hypertonicity and tenderness.  Thoracic: Thoracic paraspinals bilateral, hypertonicity and tenderness.  Middle trapezius bilateral, hypertonicity and tenderness.  Lumbar: Lumbar paraspinals bilateral, hypertonicity and tenderness.  Gluteal bilateral, hypertonicity and tenderness.  Piriformis bilateral, hypertonicity and tenderness.    Segmental Joint(s): Segmental joint dysfunction was assessed with motion palpation  and is identified in the following areas:  Cervical : C4 C5  Thoracic : T3, T4, T5, and T9  Lumbopelvic / Sacral SIJ : L5/S1 and R SIJ      Assessment/Plan   Today's Treatment Included: Chiropractic manipulation to the Segmental Joint(s) Cervical : C4 C5 Segmental Joint(s) Lumbopelvic/Sacral SIJ : L5/S1 and R SIJ Segmental Joint(s) Thoracic : T3, T4, T5, T6, and T9   Treatment Techniques Used : Activator/Tool assisted technique, Pelvic drop table technique, and Flexion-distraction technique  Ischemic compression  Soft-Tissue manipulation  Integrative Dry Needling (IDN) - Needles in / out:  4.  IDN: R upper trap, R C7-T1 PS    Prone PA TS mobilizations and activator; gentle side-lying L/P isolated mobilizations and drop table utilized    Soft-tissue mobilization was performed in the following areas:   Cervical paraspinal mm. bilateral, Upper Trapezius bilateral, and Levator Scap. bilateral  Thoracic Paraspinal mm. bilateral, Middle Trapezius bilateral, and Pectoralis bilateral  Lumbar Paraspinal mm. bilateral and Gluteal mm. Glute. Med. bilateral    Demonstrated upper trap/levator scap stretch      The patient tolerated today's treatment with little or no additional discomfort and was instructed to contact the office for questions or concerns.

## 2025-02-04 ENCOUNTER — ALLIED HEALTH (OUTPATIENT)
Dept: INTEGRATIVE MEDICINE | Facility: CLINIC | Age: 55
End: 2025-02-04
Payer: COMMERCIAL

## 2025-02-04 DIAGNOSIS — M54.2 CERVICALGIA: Primary | ICD-10-CM

## 2025-02-04 PROCEDURE — 97810 ACUP 1/> WO ESTIM 1ST 15 MIN: CPT | Performed by: NATUROPATH

## 2025-02-04 PROCEDURE — 97811 ACUP 1/> W/O ESTIM EA ADD 15: CPT | Performed by: NATUROPATH

## 2025-02-04 ASSESSMENT — ENCOUNTER SYMPTOMS: BACK PAIN: 1

## 2025-02-04 NOTE — PROGRESS NOTES
"SUBJECTIVE:  This is 54 y.o.  female with PMH of besity BMI 32, JYOTI, migraine, smoker and marijuana user, multiple musculoskeletal complaints, left RUTHANN and moderate right hip arthritis which failed injections under ultrasound by Ortho, fibromyalgia, migraine headache, lumbar spondylosis, failed IV infusion therapy but responds very well to chiropractic care and acupuncture and wants to continue with that.  She gets gabapentin minimal amount 300 mg takes him at night as needed.  The right hip injection gave her 100% relief of her pain but only for 3 weeks who is here for follow-up \"I am miserable with pain everywhere after I had my COVID infection\" I explained the patient that sometimes happens and usually self-limited and that should get better by itself and I suggested for her to join the COVID recovery center of .  The patient is complaining of her left trochanteric bursa pain after her left RUTHANN and I told her that we can inject that bursa for her and we will plan on left trochanteric bursa injection.  Ketorolac 60 mg IM was given to the patient and prescription of ketorolac p.o. sent to the pharmacy  Advised the patient to have a right hip replacement but if she still wants to continue with injection I recommend 80 mg of Depo-Medrol      Prior office visit:  9/10/2024: This is 54 y.o.  female with PMH of besity BMI 32, JYOTI, migraine, smoker and marijuana user, multiple musculoskeletal complaints, left RUTHANN and moderate right hip arthritis which failed injections under ultrasound by Ortho, fibromyalgia, migraine headache, lumbar spondylosis, failed IV infusion therapy but responds very well to chiropractic care and acupuncture and wants to continue with that.  She gets gabapentin minimal amount 300 mg takes him at night as needed.  Patient complains of right hip pain and x-ray showed last year progression of her osteoarthritis to moderate disease and she received on 7/16/2024 right hip intra-articular injection " who is here for follow-up eating that the injection has helped her by 100% but only for few weeks and the pain is coming back.  The patient has failed in the past meloxicam and diclofenac.  I talked to her about celecoxib and I started her on 200 mg to take 2 of them together for the severe pain and see how long that lasts her for relief.  I recommended not to take the medication on a daily basis.  Will plan on repeat right hip intra-articular injection sometime soon in addition we will plan on psoas muscle injection of the left leg with the lesser trochanter area that may give her great relief of her left hip pain post RUTHANN.  The patient is not willing to have any hip replacement till after the new year when she can afford to take a time off             Procedures:  7/16/2024 right hip joint injection patient has had 100% improvement in pain and function but only for 3 weeks and then gradually pain is coming back  3/11/2024 right hip joint injection the patient has had a 100% improvement in pain and function  IV infusion therapy the patient has had minimal% improvement in pain and function  Ultrasound-guided right hip injections by Ortho minimal response        Portions of record reviewed for pertinent issues: active problem list, medication list, allergies, family history, social history, notes from last encounter, encounters, lab results, imaging and other available records.     I have personally reviewed the [facility] report for this patient. This report is scanned into the electronic medical record. I have considered the risks of abuse, dependence, addiction and diversion. It showed no controlled substance only gabapentin 300 mg 3 times daily  OPIOID RISK ASSESSMENT SCORE 5/26 uses marijuana  Aberrant behavior: None           Employment/disability/litigation: now , Works as a  for bank full-time  Social History:  since 2015 now in the final process of adopting 2 beautiful twins Héctor  and Sabrina who are present with her in exam. The patient is a smoker and occasional marijuana user denies drinking use of illicit drugs besides marijuana. The patient finished high school and few college classes                    Diagnostic studies:  10/28/2023 right hip x-ray showed progression of arthritis and now it is moderate osteoarthritis     4/8/2023 MRI of the lumbar spine did not show any bone marrow edema or endplate changes principally the disc space was completely preserved no disc bulge or herniation, there is some facet joint hypertrophy and L4-5 L5-S1 with some spondylosis:  FINDINGS:  LUMBAR SPINE:     Alignment: The vertebral alignment is maintained.     Vertebrae/Intervertebral Discs: The vertebral bodies demonstrate  expected height. The marrow signal is within normal limits. The  intervertebral discs also demonstrate normal signal and morphology.     Conus Medullaris: The lower thoracic cord appears unremarkable. The  conus terminates at L1.     T12-L1: There is no significant central canal stenosis or neural  foraminal narrowing.     L1-2: There is no significant central canal stenosis or neural  foraminal narrowing.     L2-3: There is mild bilateral facet hypertrophy without significant  central canal stenosis or neural foraminal narrowing.     L3-4: There is mild bilateral neural foraminal stenosis secondary to  disc protrusion within the subarticular and foraminal zones and mild  facet hypertrophy. There is no significant central canal stenosis.     L4-5: There is mild-to-moderate facet hypertrophy and disc protrusion  within bilateral subarticular and foraminal zones with resulting mild  bilateral neural foraminal narrowing. There is no significant spinal  canal stenosis.     L5-S1: There is mild-to-moderate bilateral neural foraminal narrowing  secondary to disc protrusion within bilateral subarticular and  foraminal zones and moderate facet hypertrophy. There is no  significant spinal  canal stenosis.     Redemonstration of right parapelvic renal cysts. The prevertebral and  posterior paraspinous soft tissues are otherwise grossly unremarkable  on limited examination of such.     IMPRESSION:  1. Mild multilevel degenerative changes of the lumbar spine most  prominent from the levels of L4-S1 as detailed above.     12/20/2022 right hip x-ray showed mild arthritis         Review of Systems   HENT: Negative.     Eyes: Negative.    Respiratory: Negative.     Cardiovascular: Negative.    Gastrointestinal: Negative.    Endocrine: Negative.    Genitourinary: Negative.    Musculoskeletal:  Positive for arthralgias, gait problem and myalgias.   Skin: Negative.    Hematological: Negative.    Psychiatric/Behavioral: Negative.          Physical Exam  Vitals and nursing note reviewed.   Constitutional:       Appearance: Normal appearance.       HENT:      Head: Normocephalic and atraumatic.      Nose: Nose normal.   Eyes:      Extraocular Movements: Extraocular movements intact.      Conjunctiva/sclera: Conjunctivae normal.      Pupils: Pupils are equal, round, and reactive to light.   Cardiovascular:      Rate and Rhythm: Normal rate and regular rhythm.      Pulses: Normal pulses.      Heart sounds: Normal heart sounds.   Pulmonary:      Effort: Pulmonary effort is normal.      Breath sounds: Normal breath sounds.   Abdominal:      General: Abdomen is flat. Bowel sounds are normal.      Palpations: Abdomen is soft.   Skin:     General: Skin is warm.   Neurological:      Mental Status: She is alert.   Psychiatric:         Mood and Affect: Mood normal.         Behavior: Behavior normal.                      Plan  At least 50% of the visit was involved in the discussion of the options for treatment. We discussed exercises, medication, interventional therapies and surgery. Healthy life style is essential with patient hard work to achieve the wellness. In addition; discussion with the patient and/or family about  any of the diagnostic results, impressions and/or recommended diagnostic studies, prognosis, risks and benefits of treatment options, instructions for treatment and/or follow-up, importance of compliance with chosen treatment options, risk-factor reduction, and patient/family education.         Recommended Pool therapy, walking in the pool, at least 3x per week for 30 minutes  Continue self-directed physical therapy with at least daily exercises for minimum of 20-minute, brochure was handed to the patient  Ketorolac 60 mg IM followed by ketorolac p.o.  Left trochanteric bursa injection  Healthy lifestyle and anti-inflammatory diet in addition to weight control discussed with the patient  Alternative chronic pain therapies was discussed, encouraged and information was handed  Return to Clinic after injection     *Please note this report has been produced using speech recognition software and may contain errors related to that system including grammar, punctuation and spelling as well as words and phrases that may be inappropriate. If there are questions or concerns, please feel free to contact me to clarify.    Juliano Riley MD

## 2025-02-04 NOTE — PROGRESS NOTES
Acupuncture Visit:     Subjective   Patient ID: Miguel Angel Dominguez is a 54 y.o. female who presents for No chief complaint on file.  1/30 VPCY    Nack pain  [place]  Hx of neck problems and once it flares it takes a while to settle  Pain neck and shoudlers  Woke Saturday with neck pain, worse on right  Has been trying ice and heat, home stretches  Does have discomfort on left into shoulder, none into hands.   When rotationg head to right pain 5/10  Pain at night getting comfortable.     Ibuprofen 800, previously prescribed.    Still occ low back pain.     Hx fibro    Hx of covid oct 2024, had flare of fibro    Previous:  States she was feeling good so she didn't need to come back for tx.  Notes pain started up again in R hip/groin area, injection to R groin in March 2024.  R hip pn level is 9-10/10  LBP is 5/10 daily  Notes injection is wearing off, she is scheduled for another one soon.  L hip also bothers her daily but it is mild.  Sleep is fair, pain waking her up.    Prev-12/5/23  States she had relief for a few days after the acu tx.  Notes this is the only therapy that had helped her back.  Note she is getting a R HIP injection today, this is her 3rd injection.    previous  Felt improvement for a few days  Notes increase in pain - since TG- was on he feet a lot- pain has increased.   Pain level today is an 8/10 - R HIP  4/10 pain level In her LB    Initial:  Hip-  hx of left THR 2021 now right hip is getting bad  Has been getting injections which have helped  Is due for surgery, but is waiting due to past complications with left hip.   Pain right now is moderate, but can be severe, worse with movement  Pain can radiate to medial thigh  Can get pain in knees. Occ into feet.         Back Pain        Session Information  Is this acupuncture treatment being billed to the patient's insurance company: Yes  This is visit number: 1  The patient has a total number of visits of: 30  Initial  Acupuncture Treatment date: 02/04/25  Name of Insurance Company: [employer]  Name of Supervising Physician: GITA  Visit Type: Follow-up visit  Medical History Reviewed: I have reviewed pertinent medical history in EHR, and no contraindications are present to provide treatment         Review of Systems   Musculoskeletal:  Positive for back pain.            Provider reviewed plan for the acupuncture session, precautions and contraindications. Patient/guardian/hospital staff has given consent to treat with full understanding of what to expect during the session. Before acupuncture began, provider explained to the patient to communicate at any time if the procedure was causing discomfort past their tolerance level. Patient agreed to advise acupuncturist. The acupuncturist counseled the patient on the risks of acupuncture treatment including pain, infection, bleeding, and no relief of pain. The patient was positioned comfortably. There was no evidence of infection at the site of needle insertions.    Objective   Physical Exam                             Assessment/Plan   Diagnoses and all orders for this visit:  Cervicalgia (Primary)

## 2025-02-06 ENCOUNTER — APPOINTMENT (OUTPATIENT)
Dept: PAIN MEDICINE | Facility: CLINIC | Age: 55
End: 2025-02-06
Payer: COMMERCIAL

## 2025-02-06 VITALS
OXYGEN SATURATION: 97 % | RESPIRATION RATE: 16 BRPM | TEMPERATURE: 98.1 F | HEIGHT: 62 IN | WEIGHT: 150 LBS | BODY MASS INDEX: 27.6 KG/M2 | HEART RATE: 72 BPM | DIASTOLIC BLOOD PRESSURE: 76 MMHG | SYSTOLIC BLOOD PRESSURE: 113 MMHG

## 2025-02-06 DIAGNOSIS — U09.9 POST COVID-19 CONDITION, UNSPECIFIED: Primary | ICD-10-CM

## 2025-02-06 DIAGNOSIS — M70.62 TROCHANTERIC BURSITIS OF LEFT HIP: ICD-10-CM

## 2025-02-06 PROCEDURE — 96372 THER/PROPH/DIAG INJ SC/IM: CPT | Performed by: ANESTHESIOLOGY

## 2025-02-06 PROCEDURE — 3008F BODY MASS INDEX DOCD: CPT | Performed by: ANESTHESIOLOGY

## 2025-02-06 PROCEDURE — 99214 OFFICE O/P EST MOD 30 MIN: CPT | Performed by: ANESTHESIOLOGY

## 2025-02-06 PROCEDURE — 2500000004 HC RX 250 GENERAL PHARMACY W/ HCPCS (ALT 636 FOR OP/ED): Performed by: ANESTHESIOLOGY

## 2025-02-06 RX ORDER — KETOROLAC TROMETHAMINE 30 MG/ML
60 INJECTION, SOLUTION INTRAMUSCULAR; INTRAVENOUS ONCE
Status: COMPLETED | OUTPATIENT
Start: 2025-02-06 | End: 2025-02-06

## 2025-02-06 RX ORDER — KETOROLAC TROMETHAMINE 10 MG/1
10 TABLET, FILM COATED ORAL EVERY 6 HOURS PRN
Qty: 20 TABLET | Refills: 0 | Status: SHIPPED | OUTPATIENT
Start: 2025-02-06 | End: 2025-02-11

## 2025-02-06 RX ADMIN — KETOROLAC TROMETHAMINE 60 MG: 30 INJECTION, SOLUTION INTRAMUSCULAR at 13:41

## 2025-02-06 ASSESSMENT — ENCOUNTER SYMPTOMS
ENDOCRINE NEGATIVE: 1
OCCASIONAL FEELINGS OF UNSTEADINESS: 0
MYALGIAS: 1
PSYCHIATRIC NEGATIVE: 1
ARTHRALGIAS: 1
CARDIOVASCULAR NEGATIVE: 1
GASTROINTESTINAL NEGATIVE: 1
EYES NEGATIVE: 1
HEMATOLOGIC/LYMPHATIC NEGATIVE: 1
DEPRESSION: 0
LOSS OF SENSATION IN FEET: 0
RESPIRATORY NEGATIVE: 1

## 2025-02-06 ASSESSMENT — COLUMBIA-SUICIDE SEVERITY RATING SCALE - C-SSRS
6. HAVE YOU EVER DONE ANYTHING, STARTED TO DO ANYTHING, OR PREPARED TO DO ANYTHING TO END YOUR LIFE?: NO
1. IN THE PAST MONTH, HAVE YOU WISHED YOU WERE DEAD OR WISHED YOU COULD GO TO SLEEP AND NOT WAKE UP?: NO
2. HAVE YOU ACTUALLY HAD ANY THOUGHTS OF KILLING YOURSELF?: NO

## 2025-02-06 ASSESSMENT — PAIN SCALES - GENERAL: PAINLEVEL_OUTOF10: 7

## 2025-02-14 ENCOUNTER — APPOINTMENT (OUTPATIENT)
Dept: OTOLARYNGOLOGY | Facility: CLINIC | Age: 55
End: 2025-02-14
Payer: COMMERCIAL

## 2025-02-14 VITALS — TEMPERATURE: 97.3 F | HEIGHT: 62 IN | BODY MASS INDEX: 29.63 KG/M2 | WEIGHT: 161 LBS

## 2025-02-14 DIAGNOSIS — H61.21 IMPACTED CERUMEN OF RIGHT EAR: Primary | ICD-10-CM

## 2025-02-14 PROCEDURE — 3008F BODY MASS INDEX DOCD: CPT | Performed by: NURSE PRACTITIONER

## 2025-02-14 ASSESSMENT — PATIENT HEALTH QUESTIONNAIRE - PHQ9
SUM OF ALL RESPONSES TO PHQ9 QUESTIONS 1 AND 2: 0
1. LITTLE INTEREST OR PLEASURE IN DOING THINGS: NOT AT ALL
2. FEELING DOWN, DEPRESSED OR HOPELESS: NOT AT ALL

## 2025-02-14 NOTE — PROGRESS NOTES
Subjective   Patient ID: Miguel Angel Dominguez is a 54 y.o. female who presents for Follow-up (Ear infection ).  HPI  This patient presents for a 1 month follow-up visit.  In review, she initially saw me in December.  Exam was consistent with bullous myringitis.  She follow-up in January with ongoing complaints of fullness and muffled hearing.  She was found to have a scab adherent to the TM.  At that time, I did not recommend removal for fear it would cause or reveal a TM perforation.  Today, she feels that her hearing is now back to normal but she does experience itching in the right ear canal.  She denies any otalgia, otorrhea.  Review of Systems  A comprehensive or 10 points review of the patient's constitutional, neurological, HEENT, pulmonary, cardiovascular and genito-urinary systems showed only those mentioned in history of present illness.    Objective   Physical Exam  Constitutional: no fever, chills, weight loss or weight gain   General appearance: Appears well, well-nourished, well groomed. No acute distress.   Communication: Normal communication   Psychiatric: Oriented to person, place and time. Normal mood and affect.   Neurologic: Cranial nerves II-XII grossly intact and symmetric bilaterally.   Head and Face:   Head: Atraumatic with no masses, lesions or scarring.   Face: Normal symmetry, no paralysis, synkinesis or facial tic. No scars or deformities.     Eyes: Conjunctiva not edematous or erythematous   Ears: External inspection of ears with no deformity, scars or masses.  Right canal essentially clear.  The scab from the TM has sloughed off and moved down the posterior canal wall.  Left canal clear.  TM intact.  No effusion or retraction noted.     Neck: Normal appearing, symmetric, trachea midline.   Cardiovascular: Examination of peripheral vascular system shows no clubbing or cyanosis.   Respiratory: No respiratory distress increased work of breathing. Inspection of the chest with symmetric chest  expansion and normal respiratory effort.   Skin: No rashes in the head or neck    Assessment/Plan     This patient presents for subsequent evaluation of acute acquired right-sided cerumen impaction secondary to recent bullous myringitis.    Reassurance given that otologic exam is now normal.  She may follow-up as needed.  All questions were answered to patient's satisfaction.    This note was created using speech recognition transcription software. Despite proofreading, several typographical errors might be present that might affect the meaning of the content. Please call with any questions.     Patient ID: Miguel Angel Dominguez is a 54 y.o. female.    Ear cerumen removal    Date/Time: 2/14/2025 11:53 AM    Performed by: CARLA Gonzales  Authorized by: CARLA Gonzales    Consent:     Consent obtained:  Verbal    Consent given by:  Patient    Risks discussed:  Pain    Alternatives discussed:  No treatment  Procedure details:     Location:  R ear    Procedure type comment:  Suction    Procedure outcomes: cerumen removed    Post-procedure details:     Inspection:  No bleeding, ear canal clear and TM intact    Hearing quality:  Improved    Procedure completion:  Tolerated well, no immediate complications      CARLA Gonzales 02/14/25 11:51 AM

## 2025-02-18 ENCOUNTER — OFFICE VISIT (OUTPATIENT)
Dept: CARDIOLOGY | Facility: HOSPITAL | Age: 55
End: 2025-02-18
Payer: COMMERCIAL

## 2025-02-18 VITALS
HEIGHT: 62 IN | HEART RATE: 66 BPM | SYSTOLIC BLOOD PRESSURE: 115 MMHG | BODY MASS INDEX: 29.81 KG/M2 | DIASTOLIC BLOOD PRESSURE: 75 MMHG | WEIGHT: 162 LBS

## 2025-02-18 DIAGNOSIS — I10 HYPERTENSION, UNSPECIFIED TYPE: ICD-10-CM

## 2025-02-18 DIAGNOSIS — E78.5 HYPERLIPIDEMIA, UNSPECIFIED HYPERLIPIDEMIA TYPE: Primary | ICD-10-CM

## 2025-02-18 LAB
ATRIAL RATE: 66 BPM
P AXIS: 44 DEGREES
P OFFSET: 182 MS
P ONSET: 118 MS
PR INTERVAL: 192 MS
Q ONSET: 214 MS
QRS COUNT: 11 BEATS
QRS DURATION: 82 MS
QT INTERVAL: 382 MS
QTC CALCULATION(BAZETT): 400 MS
QTC FREDERICIA: 394 MS
R AXIS: -38 DEGREES
T AXIS: 20 DEGREES
T OFFSET: 405 MS
VENTRICULAR RATE: 66 BPM

## 2025-02-18 PROCEDURE — 99214 OFFICE O/P EST MOD 30 MIN: CPT | Performed by: INTERNAL MEDICINE

## 2025-02-18 PROCEDURE — 93010 ELECTROCARDIOGRAM REPORT: CPT | Performed by: INTERNAL MEDICINE

## 2025-02-18 PROCEDURE — 93005 ELECTROCARDIOGRAM TRACING: CPT | Performed by: INTERNAL MEDICINE

## 2025-02-18 PROCEDURE — 3008F BODY MASS INDEX DOCD: CPT | Performed by: INTERNAL MEDICINE

## 2025-02-18 PROCEDURE — 99214 OFFICE O/P EST MOD 30 MIN: CPT | Mod: 25 | Performed by: INTERNAL MEDICINE

## 2025-02-18 PROCEDURE — 3078F DIAST BP <80 MM HG: CPT | Performed by: INTERNAL MEDICINE

## 2025-02-18 PROCEDURE — 3074F SYST BP LT 130 MM HG: CPT | Performed by: INTERNAL MEDICINE

## 2025-02-18 RX ORDER — LOSARTAN POTASSIUM 100 MG/1
100 TABLET ORAL DAILY
Qty: 90 TABLET | Refills: 3 | Status: SHIPPED | OUTPATIENT
Start: 2025-02-18 | End: 2026-02-18

## 2025-02-18 RX ORDER — AMLODIPINE BESYLATE 5 MG/1
5 TABLET ORAL DAILY
Qty: 90 TABLET | Refills: 3 | Status: SHIPPED | OUTPATIENT
Start: 2025-02-18 | End: 2026-02-18

## 2025-02-18 NOTE — PROGRESS NOTES
Subjective:  Patient returns for a routine 6-month follow-up.  She is a pleasant 54-year-old female.  She did have a cardiomyopathy previously, but her most recent echocardiogram showed a good recovery of her ejection fraction to normal.  She did have a negative nuclear stress test several years ago.    She generally had been doing quite nicely since her last visit up until November when she had a problematic bout of COVID.  Her dyspnea is improving although she still has an intermittent residual cough.  She denies any chest pain or palpitations.  She  does not feel that her activity level is quite back to baseline.  It appears that she may be going to get evaluated in a long COVID clinic.    She has not had any hospitalizations and denies any other new health concerns.  She remains compliant with her medications and says her blood pressure have been under reasonable control.  She overall is reasonably comfortable and happy with how she is doing.    Objective:  General: Alert, usual pleasant self.  HEENT: Unchanged.  Lungs: Clear without crackles or wheezing.  Cardiac: Distant heart tones without change.  Abdomen: Nontender.  Extremities: No edema.  Skin: No acute rash.  Neuro: Grossly unchanged.    EKG: Normal sinus rhythm.  Nonspecific T wave abnormality.    Lipid panel: Cholesterol-164, HDL-56, LDL-75, TG-163.    Impression/plan:  Little is generally doing reasonably well at this time.  I suspect some of her symptomatology is still related to her recent COVID episode.  We will thus monitor this.  I did not think we needed embark on any repeat cardiovascular testing in the absence of any concerning chest pain or clear evidence of congestive heart failure.    Her blood pressure remains under very good control on amlodipine and losartan so we will continue this unchanged.    Her lipid panel also looks excellent on low-dose atorvastatin.    I will have her call me in several months to update me how she is doing.   Hopefully her symptoms will continue to resolve as she gets further out from her bout of COVID.  She knows to call for any setbacks that would prompt an earlier visit.  I will see her back for routine follow-up in 6 months.    I took the liberty of renewing some of her medications for her.    Patient instructions:    Continue current medications unchanged.    Call with an update in 3 months.    Return to clinic in 6 months.

## 2025-02-24 ENCOUNTER — APPOINTMENT (OUTPATIENT)
Dept: INTEGRATIVE MEDICINE | Facility: CLINIC | Age: 55
End: 2025-02-24
Payer: COMMERCIAL

## 2025-02-24 ENCOUNTER — APPOINTMENT (OUTPATIENT)
Dept: PAIN MEDICINE | Facility: CLINIC | Age: 55
End: 2025-02-24
Payer: COMMERCIAL

## 2025-02-28 ENCOUNTER — APPOINTMENT (OUTPATIENT)
Dept: INTEGRATIVE MEDICINE | Facility: CLINIC | Age: 55
End: 2025-02-28
Payer: COMMERCIAL

## 2025-02-28 ENCOUNTER — OFFICE VISIT (OUTPATIENT)
Dept: OBSTETRICS AND GYNECOLOGY | Facility: CLINIC | Age: 55
End: 2025-02-28
Payer: COMMERCIAL

## 2025-02-28 VITALS
BODY MASS INDEX: 28.61 KG/M2 | HEIGHT: 62 IN | DIASTOLIC BLOOD PRESSURE: 72 MMHG | SYSTOLIC BLOOD PRESSURE: 108 MMHG | WEIGHT: 155.5 LBS

## 2025-02-28 DIAGNOSIS — Z01.419 NORMAL GYNECOLOGIC EXAMINATION: ICD-10-CM

## 2025-02-28 DIAGNOSIS — M54.17 LUMBOSACRAL RADICULITIS: ICD-10-CM

## 2025-02-28 DIAGNOSIS — N95.2 ATROPHIC VAGINITIS: ICD-10-CM

## 2025-02-28 DIAGNOSIS — M99.05 SEGMENTAL AND SOMATIC DYSFUNCTION OF PELVIC REGION: ICD-10-CM

## 2025-02-28 DIAGNOSIS — M79.9 POSTURAL STRAIN: ICD-10-CM

## 2025-02-28 DIAGNOSIS — M99.03 SEGMENTAL AND SOMATIC DYSFUNCTION OF LUMBAR REGION: ICD-10-CM

## 2025-02-28 DIAGNOSIS — M79.7 FIBROMYALGIA: ICD-10-CM

## 2025-02-28 DIAGNOSIS — M99.02 SEGMENTAL AND SOMATIC DYSFUNCTION OF THORACIC REGION: ICD-10-CM

## 2025-02-28 DIAGNOSIS — M54.2 CERVICALGIA: Primary | ICD-10-CM

## 2025-02-28 DIAGNOSIS — M99.01 SEGMENTAL AND SOMATIC DYSFUNCTION OF CERVICAL REGION: ICD-10-CM

## 2025-02-28 DIAGNOSIS — M79.10 MYALGIA: ICD-10-CM

## 2025-02-28 DIAGNOSIS — Z12.31 SCREENING MAMMOGRAM FOR BREAST CANCER: Primary | ICD-10-CM

## 2025-02-28 PROCEDURE — 98941 CHIROPRACT MANJ 3-4 REGIONS: CPT | Performed by: CHIROPRACTOR

## 2025-02-28 PROCEDURE — 99214 OFFICE O/P EST MOD 30 MIN: CPT | Performed by: OBSTETRICS & GYNECOLOGY

## 2025-02-28 PROCEDURE — 3008F BODY MASS INDEX DOCD: CPT | Performed by: OBSTETRICS & GYNECOLOGY

## 2025-02-28 RX ORDER — ESTRADIOL 0.1 MG/G
1 CREAM VAGINAL 3 TIMES WEEKLY
Qty: 36 G | Refills: 3 | Status: SHIPPED | OUTPATIENT
Start: 2025-02-28 | End: 2026-02-28

## 2025-02-28 ASSESSMENT — ENCOUNTER SYMPTOMS
EYES NEGATIVE: 1
MYALGIAS: 1
HEMATOLOGIC/LYMPHATIC NEGATIVE: 1
CARDIOVASCULAR NEGATIVE: 1
ARTHRALGIAS: 1
ENDOCRINE NEGATIVE: 1
RESPIRATORY NEGATIVE: 1
PSYCHIATRIC NEGATIVE: 1
ALLERGIC/IMMUNOLOGIC NEGATIVE: 1
GASTROINTESTINAL NEGATIVE: 1
NEUROLOGICAL NEGATIVE: 1
FATIGUE: 1
BACK PAIN: 1

## 2025-02-28 ASSESSMENT — PATIENT HEALTH QUESTIONNAIRE - PHQ9
2. FEELING DOWN, DEPRESSED OR HOPELESS: SEVERAL DAYS
10. IF YOU CHECKED OFF ANY PROBLEMS, HOW DIFFICULT HAVE THESE PROBLEMS MADE IT FOR YOU TO DO YOUR WORK, TAKE CARE OF THINGS AT HOME, OR GET ALONG WITH OTHER PEOPLE: SOMEWHAT DIFFICULT
SUM OF ALL RESPONSES TO PHQ9 QUESTIONS 1 & 2: 1
1. LITTLE INTEREST OR PLEASURE IN DOING THINGS: NOT AT ALL

## 2025-02-28 ASSESSMENT — PAIN SCALES - GENERAL: PAINLEVEL_OUTOF10: 0-NO PAIN

## 2025-02-28 NOTE — PROGRESS NOTES
Subjective   Patient ID: Miguel Angel Dominguez is a 54 y.o. female who presents February 28, 2025 for neck/upper back and low back pain.    (2/15) [facility]    Today, the patient rates their degree of pain as a 5 out of 10 on the numeric pain rating scale.     Miguel Angel returns for continued treatment of their neck/upper back and low back pain. Patient states that she is okay today. She states that she had improvement in symptoms last treatment with Dr. Matute. She states that she continues to have right sided neck pain. She states that when she has increased neck pain it effect the rest of her body and she has had increased low back symptoms as well. She states that she is still dealing with long covid. Denies any associated symptoms or change in medical history since last visit and will follow up in 1 week.       Objective   Physical Exam  Constitutional:       General: She is not in acute distress.     Appearance: Normal appearance.       HENT:      Head: Normocephalic and atraumatic.   Musculoskeletal:      Cervical back: Tenderness present. Decreased range of motion.      Thoracic back: Tenderness present.      Lumbar back: Tenderness present. Decreased range of motion.   Neurological:      General: No focal deficit present.      Mental Status: She is alert and oriented to person, place, and time.      Cranial Nerves: No dysarthria or facial asymmetry.      Sensory: Sensation is intact.      Motor: Motor function is intact.      Coordination: Coordination is intact.      Gait: Gait is intact.   Psychiatric:         Attention and Perception: Attention normal.         Mood and Affect: Mood normal.         Speech: Speech normal.         Behavior: Behavior is cooperative.         Palpation of the following region(s) revealed:  Cervical: Suboccipitals bilateral, hypertonicity and tenderness.  Upper trapezius bilateral, hypertonicity and tenderness.  Levator scapulae bilateral, hypertonicity and tenderness.  Cervical paraspinals  bilateral, hypertonicity and tenderness.  Thoracic: Thoracic paraspinals bilateral, hypertonicity and tenderness.  Middle trapezius bilateral, hypertonicity and tenderness.  Lumbar: Lumbar paraspinals bilateral, hypertonicity and tenderness.  Gluteal bilateral, hypertonicity and tenderness.  Piriformis bilateral, hypertonicity and tenderness.  Upper Extremity: Pectoralis bilateral, hypertonicity and tenderness.      Segmental Joint(s): Segmental joint dysfunction was assessed with motion palpation and is identified in the following areas:  Cervical : C1 C4 C7  Thoracic : T1, T4, T5, and T9  Lumbopelvic / Sacral SIJ : L4, L5, L5/S1, and R SIJ      Assessment/Plan   Today's Treatment Included: Chiropractic manipulation to the Segmental Joint(s) Cervical : C1 C4 C7 Segmental Joint(s) Lumbopelvic/Sacral SIJ : L4, L5, L5/S1, and R SIJ Segmental Joint(s) Thoracic : T1, T4, T5, and T9   Treatment Techniques Used : Diversified CMT, Pelvic drop table technique, and Manual traction  Ischemic compression  Soft-Tissue manipulation    Soft-tissue mobilization was performed in the following areas:   Cervical paraspinal mm. bilateral, Upper Trapezius bilateral, and Levator Scap. bilateral  Thoracic Paraspinal mm. bilateral, Middle Trapezius bilateral, and Pectoralis bilateral  Lumbar Paraspinal mm. bilateral and Gluteal mm. Glute. Med. bilateral        The patient tolerated today's treatment with little or no additional discomfort and was instructed to contact the office for questions or concerns.

## 2025-02-28 NOTE — PROGRESS NOTES
Subjective   Patient ID: Miguel Angel Dominguez is a 54 y.o. female who presents for Annual Exam (Pt also c/o lower abdominal cramps./Last pap:  2/3/2021  neg/neg./Last ajay:  2/13/2024  (right diag)  cat 1./                  5/15/2023  bilat  cat 1./Last colon screen:  per pt 2019./Declines geoff.  NOÉ Mcpherson LPN).  HPI patient is here for annual visit she complains of cramping in the lower abdomen yesterday and not today that cramping does not happen often patient suffers from fibromyalgia and she has normal number of pain complaints she is being treated by pain medicine team    Review of Systems   Constitutional:  Positive for fatigue.   Eyes: Negative.    Respiratory: Negative.     Cardiovascular: Negative.    Gastrointestinal: Negative.    Endocrine: Negative.    Genitourinary: Negative.    Musculoskeletal:  Positive for arthralgias, back pain and myalgias.   Skin: Negative.    Allergic/Immunologic: Negative.    Neurological: Negative.    Hematological: Negative.    Psychiatric/Behavioral: Negative.         Objective   Physical Exam  Constitutional:       Appearance: Normal appearance.   HENT:      Head: Normocephalic and atraumatic.   Cardiovascular:      Rate and Rhythm: Normal rate and regular rhythm.      Pulses: Normal pulses.      Heart sounds: Normal heart sounds.   Pulmonary:      Effort: Pulmonary effort is normal.      Breath sounds: Normal breath sounds.   Abdominal:      General: Abdomen is flat. Bowel sounds are normal.      Palpations: Abdomen is soft.      Hernia: There is no hernia in the left inguinal area or right inguinal area.   Genitourinary:     General: Normal vulva.      Exam position: Lithotomy position.      Labia:         Right: No rash, tenderness or lesion.         Left: No rash, tenderness or lesion.       Urethra: No prolapse.      Cervix: Normal.      Uterus: Normal.       Adnexa: Right adnexa normal and left adnexa normal.      Comments: Vagina atrophic  Musculoskeletal:       Cervical back: Normal range of motion and neck supple.   Skin:     General: Skin is warm and dry.   Neurological:      General: No focal deficit present.      Mental Status: She is alert and oriented to person, place, and time.         Assessment/Plan    normal gynecologic examination  Fibromyalgia  Screening for breast cancer         Alberto Gay MD 02/28/25 2:37 PM

## 2025-03-06 DIAGNOSIS — E78.5 HYPERLIPIDEMIA, UNSPECIFIED: ICD-10-CM

## 2025-03-11 RX ORDER — ATORVASTATIN CALCIUM 10 MG/1
10 TABLET, FILM COATED ORAL DAILY
Qty: 90 TABLET | Refills: 0 | Status: SHIPPED | OUTPATIENT
Start: 2025-03-11

## 2025-03-13 ENCOUNTER — APPOINTMENT (OUTPATIENT)
Dept: INTEGRATIVE MEDICINE | Facility: CLINIC | Age: 55
End: 2025-03-13
Payer: COMMERCIAL

## 2025-03-13 DIAGNOSIS — M99.03 SEGMENTAL AND SOMATIC DYSFUNCTION OF LUMBAR REGION: ICD-10-CM

## 2025-03-13 DIAGNOSIS — M79.9 POSTURAL STRAIN: ICD-10-CM

## 2025-03-13 DIAGNOSIS — M99.05 SEGMENTAL AND SOMATIC DYSFUNCTION OF PELVIC REGION: ICD-10-CM

## 2025-03-13 DIAGNOSIS — M99.02 SEGMENTAL AND SOMATIC DYSFUNCTION OF THORACIC REGION: ICD-10-CM

## 2025-03-13 DIAGNOSIS — M99.01 SEGMENTAL AND SOMATIC DYSFUNCTION OF CERVICAL REGION: Primary | ICD-10-CM

## 2025-03-13 DIAGNOSIS — M54.17 LUMBOSACRAL RADICULITIS: ICD-10-CM

## 2025-03-13 DIAGNOSIS — M79.10 MYALGIA: ICD-10-CM

## 2025-03-13 DIAGNOSIS — M54.2 CERVICALGIA: ICD-10-CM

## 2025-03-13 PROCEDURE — 98941 CHIROPRACT MANJ 3-4 REGIONS: CPT | Performed by: CHIROPRACTOR

## 2025-03-13 NOTE — PROGRESS NOTES
Subjective   Patient ID: Miguel Angel Dominguez is a 54 y.o. female who presents March 13, 2025 for neck/upper back and low back pain.    (3/15) [facility]    Today, the patient rates their degree of pain as a 4 out of 10 on the numeric pain rating scale.     Miguel Angel returns for continued treatment of their neck/upper back and low back pain. Patient states that she is well today. She states that she had mild improvement in symptoms after last visit that has lasted until today. She states that she has had a flare up of low back pain that began several days ago and was very severe yesterday. She states that she iced and walked for about 30 minutes yesterday and that helped relieve low back symptoms. Denies any associated symptoms or change in medical history since last visit and will follow up in 1 week.       Objective   Physical Exam  Constitutional:       General: She is not in acute distress.     Appearance: Normal appearance.       HENT:      Head: Normocephalic and atraumatic.   Musculoskeletal:      Cervical back: Tenderness present. Decreased range of motion.      Thoracic back: Tenderness present.      Lumbar back: Tenderness present. Decreased range of motion.   Neurological:      General: No focal deficit present.      Mental Status: She is alert and oriented to person, place, and time.      Cranial Nerves: No dysarthria or facial asymmetry.      Sensory: Sensation is intact.      Motor: Motor function is intact.      Coordination: Coordination is intact.      Gait: Gait is intact.   Psychiatric:         Attention and Perception: Attention normal.         Mood and Affect: Mood normal.         Speech: Speech normal.         Behavior: Behavior is cooperative.       Palpation of the following region(s) revealed:  Cervical: Suboccipitals bilateral, hypertonicity and tenderness.  Upper trapezius bilateral, hypertonicity and tenderness.  Levator scapulae bilateral, hypertonicity and tenderness.  Cervical paraspinals  bilateral, hypertonicity and tenderness.  Thoracic: Thoracic paraspinals bilateral, hypertonicity and tenderness.  Middle trapezius bilateral, hypertonicity and tenderness.  Lumbar: Lumbar paraspinals bilateral, hypertonicity and tenderness.  Gluteal bilateral, hypertonicity and tenderness.  Piriformis bilateral, hypertonicity and tenderness.  Upper Extremity: Pectoralis bilateral, hypertonicity and tenderness.      Segmental Joint(s): Segmental joint dysfunction was assessed with motion palpation and is identified in the following areas:  Cervical : C1 C4 C7  Thoracic : T1, T4, T5, and T9  Lumbopelvic / Sacral SIJ : L4, L5, L5/S1, and R SIJ      Assessment/Plan   Today's Treatment Included: Chiropractic manipulation to the Segmental Joint(s) Cervical : C1 C4 C7 Segmental Joint(s) Lumbopelvic/Sacral SIJ : L4, L5, L5/S1, and R SIJ Segmental Joint(s) Thoracic : T1, T4, T5, and T9   Treatment Techniques Used : Diversified CMT, Pelvic drop table technique, and Manual traction  Ischemic compression  Soft-Tissue manipulation    Soft-tissue mobilization was performed in the following areas:   Cervical paraspinal mm. bilateral, Upper Trapezius bilateral, and Levator Scap. bilateral  Thoracic Paraspinal mm. bilateral, Middle Trapezius bilateral, and Pectoralis bilateral  Lumbar Paraspinal mm. bilateral and Gluteal mm. Glute. Med. bilateral        The patient tolerated today's treatment with little or no additional discomfort and was instructed to contact the office for questions or concerns.

## 2025-03-21 DIAGNOSIS — E03.9 HYPOTHYROIDISM, UNSPECIFIED: ICD-10-CM

## 2025-03-24 RX ORDER — LEVOTHYROXINE SODIUM 125 UG/1
125 TABLET ORAL DAILY
Qty: 90 TABLET | Refills: 2 | Status: SHIPPED | OUTPATIENT
Start: 2025-03-24

## 2025-03-27 ENCOUNTER — APPOINTMENT (OUTPATIENT)
Dept: INTEGRATIVE MEDICINE | Facility: CLINIC | Age: 55
End: 2025-03-27
Payer: COMMERCIAL

## 2025-04-07 ENCOUNTER — APPOINTMENT (OUTPATIENT)
Dept: OCCUPATIONAL THERAPY | Facility: CLINIC | Age: 55
End: 2025-04-07
Payer: COMMERCIAL

## 2025-04-10 ENCOUNTER — HOSPITAL ENCOUNTER (OUTPATIENT)
Dept: CARDIOLOGY | Facility: HOSPITAL | Age: 55
Discharge: HOME | End: 2025-04-10
Payer: COMMERCIAL

## 2025-04-10 ENCOUNTER — HOSPITAL ENCOUNTER (EMERGENCY)
Facility: HOSPITAL | Age: 55
Discharge: HOME | End: 2025-04-11
Attending: GENERAL PRACTICE
Payer: COMMERCIAL

## 2025-04-10 ENCOUNTER — APPOINTMENT (OUTPATIENT)
Dept: RADIOLOGY | Facility: HOSPITAL | Age: 55
End: 2025-04-10
Payer: COMMERCIAL

## 2025-04-10 DIAGNOSIS — J18.9 COMMUNITY ACQUIRED PNEUMONIA, UNSPECIFIED LATERALITY: Primary | ICD-10-CM

## 2025-04-10 LAB
ALBUMIN SERPL BCP-MCNC: 4.5 G/DL (ref 3.4–5)
ALP SERPL-CCNC: 96 U/L (ref 33–110)
ALT SERPL W P-5'-P-CCNC: 82 U/L (ref 7–45)
ANION GAP SERPL CALC-SCNC: 14 MMOL/L (ref 10–20)
AST SERPL W P-5'-P-CCNC: 44 U/L (ref 9–39)
BASOPHILS # BLD AUTO: 0.02 X10*3/UL (ref 0–0.1)
BASOPHILS NFR BLD AUTO: 0.2 %
BILIRUB SERPL-MCNC: 1.3 MG/DL (ref 0–1.2)
BNP SERPL-MCNC: 34 PG/ML (ref 0–99)
BUN SERPL-MCNC: 7 MG/DL (ref 6–23)
CALCIUM SERPL-MCNC: 9.3 MG/DL (ref 8.6–10.3)
CARDIAC TROPONIN I PNL SERPL HS: 6 NG/L (ref 0–13)
CHLORIDE SERPL-SCNC: 110 MMOL/L (ref 98–107)
CO2 SERPL-SCNC: 22 MMOL/L (ref 21–32)
CREAT SERPL-MCNC: 0.7 MG/DL (ref 0.5–1.05)
D DIMER PPP FEU-MCNC: 1612 NG/ML FEU
EGFRCR SERPLBLD CKD-EPI 2021: >90 ML/MIN/1.73M*2
EOSINOPHIL # BLD AUTO: 0 X10*3/UL (ref 0–0.7)
EOSINOPHIL NFR BLD AUTO: 0 %
ERYTHROCYTE [DISTWIDTH] IN BLOOD BY AUTOMATED COUNT: 11.6 % (ref 11.5–14.5)
FLUAV RNA RESP QL NAA+PROBE: NOT DETECTED
FLUBV RNA RESP QL NAA+PROBE: NOT DETECTED
GLUCOSE SERPL-MCNC: 107 MG/DL (ref 74–99)
HCT VFR BLD AUTO: 34.5 % (ref 36–46)
HGB BLD-MCNC: 12.5 G/DL (ref 12–16)
IMM GRANULOCYTES # BLD AUTO: 0.06 X10*3/UL (ref 0–0.7)
IMM GRANULOCYTES NFR BLD AUTO: 0.5 % (ref 0–0.9)
LYMPHOCYTES # BLD AUTO: 2.18 X10*3/UL (ref 1.2–4.8)
LYMPHOCYTES NFR BLD AUTO: 18.6 %
MCH RBC QN AUTO: 30.9 PG (ref 26–34)
MCHC RBC AUTO-ENTMCNC: 36.2 G/DL (ref 32–36)
MCV RBC AUTO: 85 FL (ref 80–100)
MONOCYTES # BLD AUTO: 0.86 X10*3/UL (ref 0.1–1)
MONOCYTES NFR BLD AUTO: 7.4 %
NEUTROPHILS # BLD AUTO: 8.58 X10*3/UL (ref 1.2–7.7)
NEUTROPHILS NFR BLD AUTO: 73.3 %
NRBC BLD-RTO: 0 /100 WBCS (ref 0–0)
PLATELET # BLD AUTO: 223 X10*3/UL (ref 150–450)
POTASSIUM SERPL-SCNC: 3.3 MMOL/L (ref 3.5–5.3)
PROT SERPL-MCNC: 7.4 G/DL (ref 6.4–8.2)
RBC # BLD AUTO: 4.05 X10*6/UL (ref 4–5.2)
RSV RNA RESP QL NAA+PROBE: NOT DETECTED
SARS-COV-2 RNA RESP QL NAA+PROBE: NOT DETECTED
SODIUM SERPL-SCNC: 143 MMOL/L (ref 136–145)
WBC # BLD AUTO: 11.7 X10*3/UL (ref 4.4–11.3)

## 2025-04-10 PROCEDURE — 80053 COMPREHEN METABOLIC PANEL: CPT | Performed by: GENERAL PRACTICE

## 2025-04-10 PROCEDURE — 83880 ASSAY OF NATRIURETIC PEPTIDE: CPT | Performed by: GENERAL PRACTICE

## 2025-04-10 PROCEDURE — 93005 ELECTROCARDIOGRAM TRACING: CPT

## 2025-04-10 PROCEDURE — 96374 THER/PROPH/DIAG INJ IV PUSH: CPT | Mod: 59

## 2025-04-10 PROCEDURE — 2550000001 HC RX 255 CONTRASTS: Performed by: GENERAL PRACTICE

## 2025-04-10 PROCEDURE — 85025 COMPLETE CBC W/AUTO DIFF WBC: CPT | Performed by: GENERAL PRACTICE

## 2025-04-10 PROCEDURE — 71045 X-RAY EXAM CHEST 1 VIEW: CPT

## 2025-04-10 PROCEDURE — 2500000004 HC RX 250 GENERAL PHARMACY W/ HCPCS (ALT 636 FOR OP/ED): Performed by: GENERAL PRACTICE

## 2025-04-10 PROCEDURE — 99285 EMERGENCY DEPT VISIT HI MDM: CPT | Mod: 25 | Performed by: GENERAL PRACTICE

## 2025-04-10 PROCEDURE — 36415 COLL VENOUS BLD VENIPUNCTURE: CPT | Performed by: GENERAL PRACTICE

## 2025-04-10 PROCEDURE — 85379 FIBRIN DEGRADATION QUANT: CPT | Performed by: GENERAL PRACTICE

## 2025-04-10 PROCEDURE — 87637 SARSCOV2&INF A&B&RSV AMP PRB: CPT | Performed by: GENERAL PRACTICE

## 2025-04-10 PROCEDURE — 84484 ASSAY OF TROPONIN QUANT: CPT | Performed by: GENERAL PRACTICE

## 2025-04-10 PROCEDURE — 71045 X-RAY EXAM CHEST 1 VIEW: CPT | Performed by: RADIOLOGY

## 2025-04-10 PROCEDURE — 71275 CT ANGIOGRAPHY CHEST: CPT

## 2025-04-10 PROCEDURE — 71275 CT ANGIOGRAPHY CHEST: CPT | Performed by: STUDENT IN AN ORGANIZED HEALTH CARE EDUCATION/TRAINING PROGRAM

## 2025-04-10 RX ORDER — FENTANYL CITRATE 50 UG/ML
50 INJECTION, SOLUTION INTRAMUSCULAR; INTRAVENOUS ONCE
Status: COMPLETED | OUTPATIENT
Start: 2025-04-10 | End: 2025-04-10

## 2025-04-10 RX ADMIN — FENTANYL CITRATE 50 MCG: 0.05 INJECTION, SOLUTION INTRAMUSCULAR; INTRAVENOUS at 21:06

## 2025-04-10 RX ADMIN — IOHEXOL 75 ML: 350 INJECTION, SOLUTION INTRAVENOUS at 22:20

## 2025-04-10 ASSESSMENT — PAIN - FUNCTIONAL ASSESSMENT: PAIN_FUNCTIONAL_ASSESSMENT: 0-10

## 2025-04-10 ASSESSMENT — PAIN SCALES - GENERAL
PAINLEVEL_OUTOF10: 10 - WORST POSSIBLE PAIN
PAINLEVEL_OUTOF10: 10 - WORST POSSIBLE PAIN

## 2025-04-10 ASSESSMENT — PAIN DESCRIPTION - LOCATION
LOCATION: CHEST
LOCATION: CHEST

## 2025-04-10 NOTE — ED TRIAGE NOTES
Pt to ed w c/o sob, cp, efren leg pain, and HA that started this morning. Pt states she has a hx of cardiomyopathy, fibromyalgia, and HTN. Pt states she did not take her BP meds today.

## 2025-04-11 ENCOUNTER — APPOINTMENT (OUTPATIENT)
Dept: RADIOLOGY | Facility: HOSPITAL | Age: 55
End: 2025-04-11
Payer: COMMERCIAL

## 2025-04-11 VITALS
SYSTOLIC BLOOD PRESSURE: 128 MMHG | HEIGHT: 62 IN | BODY MASS INDEX: 28.71 KG/M2 | OXYGEN SATURATION: 96 % | WEIGHT: 156 LBS | RESPIRATION RATE: 16 BRPM | HEART RATE: 71 BPM | DIASTOLIC BLOOD PRESSURE: 75 MMHG | TEMPERATURE: 98.7 F

## 2025-04-11 PROCEDURE — 93970 EXTREMITY STUDY: CPT

## 2025-04-11 PROCEDURE — 96375 TX/PRO/DX INJ NEW DRUG ADDON: CPT

## 2025-04-11 PROCEDURE — 2500000004 HC RX 250 GENERAL PHARMACY W/ HCPCS (ALT 636 FOR OP/ED): Performed by: GENERAL PRACTICE

## 2025-04-11 PROCEDURE — 2500000002 HC RX 250 W HCPCS SELF ADMINISTERED DRUGS (ALT 637 FOR MEDICARE OP, ALT 636 FOR OP/ED): Performed by: GENERAL PRACTICE

## 2025-04-11 PROCEDURE — 93971 EXTREMITY STUDY: CPT | Performed by: RADIOLOGY

## 2025-04-11 PROCEDURE — 2500000001 HC RX 250 WO HCPCS SELF ADMINISTERED DRUGS (ALT 637 FOR MEDICARE OP): Performed by: GENERAL PRACTICE

## 2025-04-11 RX ORDER — POTASSIUM CHLORIDE 20 MEQ/1
40 TABLET, EXTENDED RELEASE ORAL ONCE
Status: COMPLETED | OUTPATIENT
Start: 2025-04-11 | End: 2025-04-11

## 2025-04-11 RX ORDER — DOXYCYCLINE 100 MG/1
100 CAPSULE ORAL 2 TIMES DAILY
Qty: 20 CAPSULE | Refills: 0 | Status: SHIPPED | OUTPATIENT
Start: 2025-04-11 | End: 2025-04-21

## 2025-04-11 RX ORDER — DOXYCYCLINE HYCLATE 100 MG
100 TABLET ORAL ONCE
Status: COMPLETED | OUTPATIENT
Start: 2025-04-11 | End: 2025-04-11

## 2025-04-11 RX ORDER — KETOROLAC TROMETHAMINE 30 MG/ML
30 INJECTION, SOLUTION INTRAMUSCULAR; INTRAVENOUS ONCE
Status: COMPLETED | OUTPATIENT
Start: 2025-04-11 | End: 2025-04-11

## 2025-04-11 RX ORDER — BENZONATATE 100 MG/1
100 CAPSULE ORAL 3 TIMES DAILY PRN
Qty: 21 CAPSULE | Refills: 0 | Status: SHIPPED | OUTPATIENT
Start: 2025-04-11 | End: 2025-04-18

## 2025-04-11 RX ORDER — ALBUTEROL SULFATE 90 UG/1
2 INHALANT RESPIRATORY (INHALATION) EVERY 4 HOURS PRN
Qty: 18 G | Refills: 0 | Status: SHIPPED | OUTPATIENT
Start: 2025-04-11 | End: 2025-05-11

## 2025-04-11 RX ADMIN — DOXYCYCLINE HYCLATE 100 MG: 100 TABLET, COATED ORAL at 00:47

## 2025-04-11 RX ADMIN — KETOROLAC TROMETHAMINE 30 MG: 30 INJECTION, SOLUTION INTRAMUSCULAR at 00:47

## 2025-04-11 RX ADMIN — POTASSIUM CHLORIDE 40 MEQ: 1500 TABLET, EXTENDED RELEASE ORAL at 02:34

## 2025-04-11 ASSESSMENT — PAIN DESCRIPTION - LOCATION
LOCATION: LEG
LOCATION: LEG

## 2025-04-11 ASSESSMENT — PAIN SCALES - GENERAL
PAINLEVEL_OUTOF10: 2
PAINLEVEL_OUTOF10: 10 - WORST POSSIBLE PAIN

## 2025-04-11 ASSESSMENT — PAIN DESCRIPTION - ORIENTATION
ORIENTATION: RIGHT;LEFT
ORIENTATION: RIGHT;LEFT

## 2025-04-11 ASSESSMENT — PAIN - FUNCTIONAL ASSESSMENT: PAIN_FUNCTIONAL_ASSESSMENT: 0-10

## 2025-04-14 ENCOUNTER — TELEPHONE (OUTPATIENT)
Dept: PRIMARY CARE | Facility: CLINIC | Age: 55
End: 2025-04-14

## 2025-04-14 DIAGNOSIS — J43.9 PULMONARY EMPHYSEMA, UNSPECIFIED EMPHYSEMA TYPE (MULTI): Primary | ICD-10-CM

## 2025-04-14 NOTE — ED PROVIDER NOTES
HPI   Chief Complaint   Patient presents with    Chest Pain    Shortness of Breath       HPI: 54-year-old female with a history of cardiomyopathy, fibromyalgia and HTN presents with shortness of breath, chest tightness and bilateral leg pain and headache.  Her symptoms began this morning.  She denies sick contacts and recent travel.  She reports mild chills without actual fever.  She denies leg swelling.      Limitations to history: None  Independent Historians: Patient  External Records Reviewed: HIE, outpatient notes, inpatient notes  ------------------------------------------------------------------------------------------------------------------------------------------  ROS: a ten point review of systems was performed and was negative except as per HPI.  ------------------------------------------------------------------------------------------------------------------------------------------  PMH / PSH: as per HPI, otherwise reviewed in EMR  MEDS: as per HPI, otherwise reviewed in EMR  ALLERGIES: as per HPI, otherwise reviewed in EMR  SocH:  as per HPI, otherwise reviewed in EMR  FH:  as per HPI, otherwise reviewed in EMR  ------------------------------------------------------------------------------------------------------------------------------------------  Physical Exam:  VS: As documented in the triage note and EMR flowsheet from this visit was reviewed  General: Fatigued appearing. No acute distress.   Eyes:  Extraocular movements grossly intact. No scleral icterus. No discharge  HEENT:  Normocephalic.  Atraumatic  Neck: Moves neck freely. No gross masses  CV: Regular rhythm. No murmurs, rubs or gallops   Resp: Clear to auscultation bilaterally. No respiratory distress.    GI: Soft, no masses, nontender. No rebound tenderness or guarding  MSK: Symmetric muscle bulk. No deformities. No lower extremity edema.    Skin: Warm, dry, intact.   Neuro: No focal deficits.  A&O x3.   Psych: Appropriate for  situation  ------------------------------------------------------------------------------------------------------------------------------------------  Hospital Course / Medical Decision Making:  Independent Interpretations: Chest x-ray, CTA chest  EKG as interpreted by me: Normal sinus rhythm at 72 bpm with no bundle branch block and no signs of acute ischemia. There are inverted T waves in V3 through V6    MDM: 54-year-old female with a history of cardiomyopathy, fibromyalgia and HTN presents with shortness of breath, chest tightness and bilateral leg pain and headache.  She is afebrile and in no acute distress.  She was given fentanyl for pain as well as Toradol.  Viral swabs are negative.  Troponin and BNP not elevated.  Potassium is decreased at 3.3 and this was repleted orally.  She has a mild transaminitis and is denying any abdominal pain and has no abdominal tenderness on my exam.  D-dimer is elevated and CTA of the chest is negative for acute pulmonary embolism.  There is a patchy consolidation in the right upper lobe and minimal groundglass opacity in the right lower lobe.  There is evidence of emphysema.  Ultrasound is negative for DVT.  Her presentation is consistent with community-acquired pneumonia.  She was given doxycycline in the ED and was provided with a prescription for doxycycline.  She is not requiring supplemental oxygen and feels safe going home.  She will follow-up with her primary care physician as soon as possible and will return to the ER for any concerns    Discussion of Management with Other Providers:   I discussed the patient/results with: Emergency medicine team    Final diagnosis and disposition as below.    Results for orders placed or performed during the hospital encounter of 04/10/25  -ECG 12 lead:   Collection Time: 04/10/25  8:15 PM       Result                      Value             Ref Range           Ventricular Rate            72                BPM                 Atrial  Rate                 72                BPM                 DC Interval                 176               ms                  QRS Duration                100               ms                  QT Interval                 408               ms                  QTC Calculation(Bazett)     446               ms                  P Axis                      -14               degrees             R Axis                      6                 degrees             T Axis                      -18               degrees             QRS Count                   12                beats               Q Onset                     214               ms                  P Onset                     126               ms                  P Offset                    189               ms                  T Offset                    418               ms                  QTC Fredericia              433               ms             -CBC and Auto Differential:   Collection Time: 04/10/25  8:42 PM       Result                      Value             Ref Range           WBC                         11.7 (H)          4.4 - 11.3 x*       nRBC                        0.0               0.0 - 0.0 /1*       RBC                         4.05              4.00 - 5.20 *       Hemoglobin                  12.5              12.0 - 16.0 *       Hematocrit                  34.5 (L)          36.0 - 46.0 %       MCV                         85                80 - 100 fL         MCH                         30.9              26.0 - 34.0 *       MCHC                        36.2 (H)          32.0 - 36.0 *       RDW                         11.6              11.5 - 14.5 %       Platelets                   223               150 - 450 x1*       Neutrophils %               73.3              40.0 - 80.0 %       Immature Granulocytes *     0.5               0.0 - 0.9 %         Lymphocytes %               18.6              13.0 - 44.0 %       Monocytes %                 7.4                2.0 - 10.0 %        Eosinophils %               0.0               0.0 - 6.0 %         Basophils %                 0.2               0.0 - 2.0 %         Neutrophils Absolute        8.58 (H)          1.20 - 7.70 *       Immature Granulocytes *     0.06              0.00 - 0.70 *       Lymphocytes Absolute        2.18              1.20 - 4.80 *       Monocytes Absolute          0.86              0.10 - 1.00 *       Eosinophils Absolute        0.00              0.00 - 0.70 *       Basophils Absolute          0.02              0.00 - 0.10 *  -Comprehensive metabolic panel:   Collection Time: 04/10/25  8:42 PM       Result                      Value             Ref Range           Glucose                     107 (H)           74 - 99 mg/dL       Sodium                      143               136 - 145 mm*       Potassium                   3.3 (L)           3.5 - 5.3 mm*       Chloride                    110 (H)           98 - 107 mmo*       Bicarbonate                 22                21 - 32 mmol*       Anion Gap                   14                10 - 20 mmol*       Urea Nitrogen               7                 6 - 23 mg/dL        Creatinine                  0.70              0.50 - 1.05 *       eGFR                        >90               >60 mL/min/1*       Calcium                     9.3               8.6 - 10.3 m*       Albumin                     4.5               3.4 - 5.0 g/*       Alkaline Phosphatase        96                33 - 110 U/L        Total Protein               7.4               6.4 - 8.2 g/*       AST                         44 (H)            9 - 39 U/L          Bilirubin, Total            1.3 (H)           0.0 - 1.2 mg*       ALT                         82 (H)            7 - 45 U/L     -Troponin I, High Sensitivity:   Collection Time: 04/10/25  8:42 PM       Result                      Value             Ref Range           Troponin I, High Sensi*     6                 0 - 13 ng/L    -B-Type  Natriuretic Peptide:   Collection Time: 04/10/25  8:42 PM       Result                      Value             Ref Range           BNP                         34                0 - 99 pg/mL   -D-Dimer, VTE Exclusion:   Collection Time: 04/10/25  8:42 PM       Result                      Value             Ref Range           D-Dimer, Quantitative *     1,612 (H)         <=500 ng/mL *  -Sars-CoV-2 and Influenza A/B PCR:   Collection Time: 04/10/25  8:53 PM       Result                      Value             Ref Range           Flu A Result                Not Detected      Not Detected        Flu B Result                Not Detected      Not Detected        Coronavirus 2019, PCR       Not Detected      Not Detected   -RSV PCR:   Collection Time: 04/10/25  8:53 PM       Result                      Value             Ref Range           RSV PCR                     Not Detected      Not Detected   Vascular US lower extremity venous duplex bilateral   Final Result    No sonographic evidence for deep vein thrombosis within the evaluated    veins of the bilateral lower extremity.          MACRO:    None          Signed by: Lm Jay 4/11/2025 1:46 AM    Dictation workstation:   [id number]     CT angio chest for pulmonary embolism   Final Result    1. No evidence of acute pulmonary embolism to the level of the    segmental pulmonary arteries.    2. Patchy consolidation in the right upper lobe and minimal    ground-glass opacity in the right lower lobe raising suspicion for    multifocal pneumonia. Recommend follow-up chest CT in 3 months to    ensure resolution.    3. Extensive pulmonary emphysema.          MACRO:    Critical Finding:  See findings. Notification was initiated on    4/10/2025 at 11:44 pm by  Timmy Aguero.  (**-YCF-**) Instructions:          Signed by: Timmy Aguero 4/10/2025 11:44 PM    Dictation workstation:   [id number]     XR chest 1 view   Final Result    1. New patchy left lower and right mid lung  airspace opacities    concerning for pneumonia.          Signed by: Watson Cano 4/10/2025 9:08 PM    Dictation workstation:   [id number]                   Patient History   Past Medical History:   Diagnosis Date    Abdominal pain, right lower quadrant 06/27/2006    Formatting of this note might be different from the original. Chronic intemittent since 10-05    Anxiety     Arthritis     Cardiomyopathy     Depression     Disease of thyroid gland     Fibromyalgia 2023    GERD (gastroesophageal reflux disease)     Heart disease     Heart murmur     Hypertension     Hyperthyroidism     Primary osteoarthritis of left hip 07/12/2019    Formatting of this note might be different from the original. Added automatically from request for surgery [id number]     Past Surgical History:   Procedure Laterality Date    JOINT REPLACEMENT      OTHER SURGICAL HISTORY  10/28/2020    Ectopic pregnancy removal    OTHER SURGICAL HISTORY  01/04/2022    Hip replacement     Family History   Problem Relation Name Age of Onset    Cardiomyopathy Father Nilo Kramer     Prostate cancer Father Nilo Freedhrcheko     Heart attack Father Nilo BIRMINGHAM Gathrcheko     Cancer Father Nilo BIRMINGHAM Gathrcheko     Diabetes Maternal Grandmother Heart attacks     Heart disease Maternal Grandmother Heart attacks     Breast cancer Paternal Grandmother Will B Ronald     Diabetes Mother's Sister Rosamaria [place]     Heart disease Mother Ofe Dominguez     Cancer Brother German Dominguez     Hypertension Brother Lillian Dominguez      Social History     Tobacco Use    Smoking status: Every Day     Current packs/day: 0.50     Average packs/day: 0.5 packs/day for 35.0 years (17.5 ttl pk-yrs)     Types: Cigarettes     Passive exposure: Current    Smokeless tobacco: Never   Vaping Use    Vaping status: Never Used   Substance Use Topics    Alcohol use: Not Currently     Alcohol/week: 1.0 standard drink of alcohol     Types: 1 Glasses of wine per week    Drug use:  Yes     Types: Marijuana     Comment: Occ Marijuana       Physical Exam   ED Triage Vitals [04/10/25 2000]   Temperature Heart Rate Respirations BP   37.1 °C (98.7 °F) 87 20 (!) 135/104      Pulse Ox Temp Source Heart Rate Source Patient Position   94 % Oral Monitor --      BP Location FiO2 (%)     -- --       Physical Exam      ED Course & MDM   Diagnoses as of 04/14/25 1747   Community acquired pneumonia, unspecified laterality                 No data recorded                                 Medical Decision Making      Procedure  Procedures     Stephen Thomas,   04/14/25 [id number]

## 2025-04-14 NOTE — TELEPHONE ENCOUNTER
Patient calling to get an appointment since being seen in the ER for pneumonia. Next available is 4/30/25. Please advise.

## 2025-04-16 LAB
ATRIAL RATE: 72 BPM
P AXIS: -14 DEGREES
P OFFSET: 189 MS
P ONSET: 126 MS
PR INTERVAL: 176 MS
Q ONSET: 214 MS
QRS COUNT: 12 BEATS
QRS DURATION: 100 MS
QT INTERVAL: 408 MS
QTC CALCULATION(BAZETT): 446 MS
QTC FREDERICIA: 433 MS
R AXIS: 6 DEGREES
T AXIS: -18 DEGREES
T OFFSET: 418 MS
VENTRICULAR RATE: 72 BPM

## 2025-04-28 ENCOUNTER — TELEPHONE (OUTPATIENT)
Dept: PAIN MEDICINE | Facility: CLINIC | Age: 55
End: 2025-04-28

## 2025-04-28 DIAGNOSIS — M70.62 TROCHANTERIC BURSITIS OF BOTH HIPS: Primary | ICD-10-CM

## 2025-04-28 DIAGNOSIS — M70.61 TROCHANTERIC BURSITIS OF BOTH HIPS: Primary | ICD-10-CM

## 2025-04-28 NOTE — PROGRESS NOTES
Orders Placed This Encounter   Procedures    [place] pain management     Standing Status:   Future     Expected Date:   4/28/2025     Expiration Date:   4/28/2026     Release result to [employer]:   Immediate [1]

## 2025-04-28 NOTE — TELEPHONE ENCOUNTER
She would like to reschedule her LEFT TROCHANTERIC BURSA INJECTION - new orders will need to be put in.    Thank you

## 2025-05-07 ENCOUNTER — APPOINTMENT (OUTPATIENT)
Dept: PRIMARY CARE | Facility: CLINIC | Age: 55
End: 2025-05-07
Payer: COMMERCIAL

## 2025-05-07 VITALS
HEIGHT: 62 IN | HEART RATE: 59 BPM | DIASTOLIC BLOOD PRESSURE: 78 MMHG | WEIGHT: 154 LBS | OXYGEN SATURATION: 97 % | SYSTOLIC BLOOD PRESSURE: 132 MMHG | BODY MASS INDEX: 28.34 KG/M2

## 2025-05-07 DIAGNOSIS — Z12.11 COLON CANCER SCREENING: ICD-10-CM

## 2025-05-07 DIAGNOSIS — E03.9 HYPOTHYROIDISM, UNSPECIFIED: ICD-10-CM

## 2025-05-07 DIAGNOSIS — E78.5 HYPERLIPIDEMIA, UNSPECIFIED: ICD-10-CM

## 2025-05-07 DIAGNOSIS — Z00.00 ANNUAL PHYSICAL EXAM: Primary | ICD-10-CM

## 2025-05-07 PROCEDURE — 99396 PREV VISIT EST AGE 40-64: CPT | Performed by: NURSE PRACTITIONER

## 2025-05-07 PROCEDURE — 3008F BODY MASS INDEX DOCD: CPT | Performed by: NURSE PRACTITIONER

## 2025-05-07 RX ORDER — LEVOTHYROXINE SODIUM 125 UG/1
125 TABLET ORAL DAILY
Qty: 90 TABLET | Refills: 3 | Status: SHIPPED | OUTPATIENT
Start: 2025-05-07

## 2025-05-07 RX ORDER — ATORVASTATIN CALCIUM 10 MG/1
10 TABLET, FILM COATED ORAL DAILY
Qty: 90 TABLET | Refills: 3 | Status: SHIPPED | OUTPATIENT
Start: 2025-05-07

## 2025-05-07 ASSESSMENT — ENCOUNTER SYMPTOMS
EYE PAIN: 0
CHILLS: 0
SORE THROAT: 0
CONSTIPATION: 0
SEIZURES: 0
APNEA: 0
HALLUCINATIONS: 0
DIZZINESS: 0
LOSS OF SENSATION IN FEET: 0
OCCASIONAL FEELINGS OF UNSTEADINESS: 0
SLEEP DISTURBANCE: 0
ACTIVITY CHANGE: 0
ARTHRALGIAS: 0
SHORTNESS OF BREATH: 0
WHEEZING: 0
TREMORS: 0
DIARRHEA: 0
FREQUENCY: 0
WEAKNESS: 0
COUGH: 0
NAUSEA: 0
RHINORRHEA: 0
WOUND: 0
ABDOMINAL PAIN: 0
CONFUSION: 0
MYALGIAS: 0
PALPITATIONS: 0
DYSURIA: 0
DEPRESSION: 0
VOMITING: 0
FEVER: 0
HEMATURIA: 0

## 2025-05-07 NOTE — H&P (VIEW-ONLY)
Subjective   Patient ID: Miguel Angel Dominguez is a 54 y.o. female who presenting for CPE.     HPI    grief, anxiety, depression: followed by psych right now, currently on wellbutrin. She lost her brother 12/19/21. She lost her mother and brother in 1986 and did not respond well to the grief, she wound up abusing drugs to treat the pain.      hx PE: completed Eliquis. Provoked PE from left total hip replacement with Dr. Dillan Greco 6/28/21 at [facility]. She developed SOB and flank pain and went to ER- found to have PE LLL.   HTN, hx cardiomyopathy: followed by Dr. Wilhelm. On losartan 100 mg QD for HTN; did not tolerate Lisinopril r/t ACE cough. amlodipine 5 mg QD. Did not tolerate BB due to baseline bradycardia. Recent echo showed reduced ejection fraction. She is not checking BP at home.   GYN: followed by Dr. Gay. Pap UTD 02/2021. OAB: followed by Avani Ta; on solifenacin succinate 5 mg every day. Using estrace vaginal cream.   hypothyroidism: Graves disease, s/p thyroidectomy in ~2000 at [facility]. levothyroxine 125 mcg QD.   OA R hip: s/p hip replacement L hip. Followed by Kwame Suh. She is doing PT. Seeing pain management tomorrow  lumbar b/l radiculopathy:  responds to aqua therapy, acupuncture. epidural steroid injections, and surgery down the line if no improvement with conservative measures. She will be seeing PM. She is taking gabapentin 300 mg 2 pills QHS PRN; which she thinks helps with nerve pain. pain did not improve with meloxicam and methocarbamol. She tried ketamine infusions for fibromyalgia with PM. Recently started Celebrex for arthritis, Ok'd by cardiologist. Also started on fluoxetine for hot flashes. Celebrex has helped quite a bit.  JYOTI: pt states she was tested and never started using cpap, will need repeat testing.      SOCIAL: She is living at home with 2 adopted children twin 12 year old girls, fostered at 10 y/o. 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 Chelsey dog at home.   no alcohol use.   tobacco use: 1/2 ppd 10 cigarettes for 35 years. She has tried Chantix with no success.   Review of Systems   Constitutional:  Negative for activity change, chills and fever.   HENT:  Negative for congestion, ear pain, rhinorrhea and sore throat.    Eyes:  Negative for pain and visual disturbance.   Respiratory:  Negative for apnea, cough, shortness of breath and wheezing.    Cardiovascular:  Negative for chest pain, palpitations and leg swelling.   Gastrointestinal:  Negative for abdominal pain, constipation, diarrhea, nausea and vomiting.   Genitourinary:  Negative for dysuria, frequency, hematuria, urgency, vaginal bleeding and vaginal discharge.   Musculoskeletal:  Negative for arthralgias, gait problem and myalgias.   Skin:  Negative for rash and wound.   Neurological:  Negative for dizziness, tremors, seizures and weakness.   Psychiatric/Behavioral:  Negative for confusion, hallucinations, sleep disturbance and suicidal ideas.      Objective   /78   Pulse 59   Ht 1.575 m (5' 2\")   Wt 69.9 kg (154 lb)   LMP  (LMP Unknown)   SpO2 97%   BMI 28.17 kg/m²     Physical Exam  Constitutional:       Appearance: Normal appearance.   HENT:      Head: Normocephalic.      Right Ear: Tympanic membrane, ear canal and external ear normal.      Left Ear: Tympanic membrane, ear canal and external ear normal.      Nose: Nose normal.      Mouth/Throat:      Mouth: Mucous membranes are moist.      Pharynx: Oropharynx is clear. No oropharyngeal exudate or posterior oropharyngeal erythema.   Eyes:      Extraocular Movements: Extraocular movements intact.      Conjunctiva/sclera: Conjunctivae normal.      Pupils: Pupils are equal, round, and reactive to light.   Cardiovascular:      Rate and Rhythm: Normal rate and regular rhythm.      Pulses: Normal pulses.      Heart sounds: Normal heart sounds.   Pulmonary:      Effort: Pulmonary effort is " normal.      Breath sounds: Normal breath sounds.   Abdominal:      General: Abdomen is flat. Bowel sounds are normal.      Palpations: Abdomen is soft.   Musculoskeletal:         General: No signs of injury. Normal range of motion.   Skin:     General: Skin is warm and dry.   Neurological:      General: No focal deficit present.      Mental Status: She is alert and oriented to person, place, and time.   Psychiatric:         Mood and Affect: Mood normal.         Behavior: Behavior normal.         Judgment: Judgment normal.       Assessment/Plan   Diagnoses and all orders for this visit:  Fibromyalgia: functional capacity testing referral ordered. Pt will schedule.     migraines: imitrex PRN.   urinary incontinence: continue solifenacin succinate 5 mg QD per urogyn   hyperlipidemia: stable, continue atorvastatin 10 mg every day, lipid panel ordered   HTN: stable, continue amlodipine 5 mg QD, losartan 100 mg QD  hypothyroidism: levothyroxine to 125 mcg every day, TSH ordered.   vaginal irritation, OAB: continue estradiol cream per GYN    health maintenance: labs ordered. paps WNL 02/2021. mammogram due 02/2025 - gyn ordered.   CPE due 5/7/2026

## 2025-05-07 NOTE — PATIENT INSTRUCTIONS
For any future breast imaging appointments, please call [phone] ([phone]).    [facility] [address]

## 2025-05-07 NOTE — PROGRESS NOTES
Subjective   Patient ID: Miguel Angel Dominguez is a 54 y.o. female who presenting for CPE.     HPI    grief, anxiety, depression: followed by psych right now, currently on wellbutrin. She lost her brother 12/19/21. She lost her mother and brother in 1986 and did not respond well to the grief, she wound up abusing drugs to treat the pain.      hx PE: completed Eliquis. Provoked PE from left total hip replacement with Dr. Dillan Greco 6/28/21 at [facility]. She developed SOB and flank pain and went to ER- found to have PE LLL.   HTN, hx cardiomyopathy: followed by Dr. Wilhelm. On losartan 100 mg QD for HTN; did not tolerate Lisinopril r/t ACE cough. amlodipine 5 mg QD. Did not tolerate BB due to baseline bradycardia. Recent echo showed reduced ejection fraction. She is not checking BP at home.   GYN: followed by Dr. Gay. Pap UTD 02/2021. OAB: followed by Avain Ta; on solifenacin succinate 5 mg every day. Using estrace vaginal cream.   hypothyroidism: Graves disease, s/p thyroidectomy in ~2000 at [facility]. levothyroxine 125 mcg QD.   OA R hip: s/p hip replacement L hip. Followed by Kwame Suh. She is doing PT. Seeing pain management tomorrow  lumbar b/l radiculopathy:  responds to aqua therapy, acupuncture. epidural steroid injections, and surgery down the line if no improvement with conservative measures. She will be seeing PM. She is taking gabapentin 300 mg 2 pills QHS PRN; which she thinks helps with nerve pain. pain did not improve with meloxicam and methocarbamol. She tried ketamine infusions for fibromyalgia with PM. Recently started Celebrex for arthritis, Ok'd by cardiologist. Also started on fluoxetine for hot flashes. Celebrex has helped quite a bit.  JYOTI: pt states she was tested and never started using cpap, will need repeat testing.      SOCIAL: She is living at home with 2 adopted children twin 12 year old girls, fostered at 8 y/o. 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 Chelsey dog at home.   no alcohol use.   tobacco use: 1/2 ppd 10 cigarettes for 35 years. She has tried Chantix with no success.   Review of Systems   Constitutional:  Negative for activity change, chills and fever.   HENT:  Negative for congestion, ear pain, rhinorrhea and sore throat.    Eyes:  Negative for pain and visual disturbance.   Respiratory:  Negative for apnea, cough, shortness of breath and wheezing.    Cardiovascular:  Negative for chest pain, palpitations and leg swelling.   Gastrointestinal:  Negative for abdominal pain, constipation, diarrhea, nausea and vomiting.   Genitourinary:  Negative for dysuria, frequency, hematuria, urgency, vaginal bleeding and vaginal discharge.   Musculoskeletal:  Negative for arthralgias, gait problem and myalgias.   Skin:  Negative for rash and wound.   Neurological:  Negative for dizziness, tremors, seizures and weakness.   Psychiatric/Behavioral:  Negative for confusion, hallucinations, sleep disturbance and suicidal ideas.      Objective   /78   Pulse 59   Ht 1.575 m (5' 2\")   Wt 69.9 kg (154 lb)   LMP  (LMP Unknown)   SpO2 97%   BMI 28.17 kg/m²     Physical Exam  Constitutional:       Appearance: Normal appearance.   HENT:      Head: Normocephalic.      Right Ear: Tympanic membrane, ear canal and external ear normal.      Left Ear: Tympanic membrane, ear canal and external ear normal.      Nose: Nose normal.      Mouth/Throat:      Mouth: Mucous membranes are moist.      Pharynx: Oropharynx is clear. No oropharyngeal exudate or posterior oropharyngeal erythema.   Eyes:      Extraocular Movements: Extraocular movements intact.      Conjunctiva/sclera: Conjunctivae normal.      Pupils: Pupils are equal, round, and reactive to light.   Cardiovascular:      Rate and Rhythm: Normal rate and regular rhythm.      Pulses: Normal pulses.      Heart sounds: Normal heart sounds.   Pulmonary:      Effort: Pulmonary effort is " normal.      Breath sounds: Normal breath sounds.   Abdominal:      General: Abdomen is flat. Bowel sounds are normal.      Palpations: Abdomen is soft.   Musculoskeletal:         General: No signs of injury. Normal range of motion.   Skin:     General: Skin is warm and dry.   Neurological:      General: No focal deficit present.      Mental Status: She is alert and oriented to person, place, and time.   Psychiatric:         Mood and Affect: Mood normal.         Behavior: Behavior normal.         Judgment: Judgment normal.       Assessment/Plan   Diagnoses and all orders for this visit:  Fibromyalgia: functional capacity testing referral ordered. Pt will schedule.     migraines: imitrex PRN.   urinary incontinence: continue solifenacin succinate 5 mg QD per urogyn   hyperlipidemia: stable, continue atorvastatin 10 mg every day, lipid panel ordered   HTN: stable, continue amlodipine 5 mg QD, losartan 100 mg QD  hypothyroidism: levothyroxine to 125 mcg every day, TSH ordered.   vaginal irritation, OAB: continue estradiol cream per GYN    health maintenance: labs ordered. paps WNL 02/2021. mammogram due 02/2025 - gyn ordered.   CPE due 5/7/2026

## 2025-05-20 ENCOUNTER — TELEPHONE (OUTPATIENT)
Dept: OTHER | Facility: CLINIC | Age: 55
End: 2025-05-20
Payer: COMMERCIAL

## 2025-05-20 NOTE — TELEPHONE ENCOUNTER
Spoke with patient regarding surveys.  Patient informed that surveys need to be completed prior to the INTAKE on Tues 5/27.  Patient voiced understanding.

## 2025-05-22 ENCOUNTER — APPOINTMENT (OUTPATIENT)
Dept: PULMONOLOGY | Facility: HOSPITAL | Age: 55
End: 2025-05-22
Payer: COMMERCIAL

## 2025-05-22 NOTE — PROGRESS NOTES
Department of Medicine I Division of Pulmonary, Critical Care, and Sleep Medicine   [id number] [place] [address] [address] [place] [address]  Phone: [phone]  Fax: [phone]    History of Present Illness   Miguel Angel Dominguez is a 54 y.o. female with PMH fibromyalgia, HTN, who presents today for NPV for emphysema    She was recently admitted to the hospital in 4/2025 for SOB, chest tightness, leg pain, and headache. She had a CTPE done which showed no PE but did have emphysema and ratna consolidations with c/f pneumonia. She was treated with doxycycline and sent home.    Today she reports ***       Review of Systems  Review of Systems      Past Medical History   Medical History[1]      Immunizations     Immunization History   Administered Date(s) Administered    Flu vaccine (IIV4), preservative free *Check age/dose* 10/13/2016, 11/01/2018, 12/03/2019, 10/15/2020, 11/16/2021    Hepatitis B vaccine, adult *Check Product/Dose* 12/30/1999, 08/23/2001, 05/02/2002, 04/28/2003    Influenza, Unspecified 11/09/2012, 10/15/2020, 11/16/2021    Influenza, injectable, quadrivalent 10/09/2015, 10/13/2016    Influenza, seasonal, injectable 10/15/2020, 11/16/2021    MMR vaccine, subcutaneous (MMR II) 08/23/2001    Moderna COVID-19 vaccine, 12 years and older (50mcg/0.5mL)(Spikevax) 10/30/2024    Moderna SARS-CoV-2 Vaccination 12/31/2021    PPD Test 12/30/1999, 08/23/2001, 12/28/2001, 01/16/2003, 01/22/2003, 01/29/2003, 05/26/2004, 01/04/2006, 03/06/2007, 03/27/2007, 05/29/2015, 08/30/2017, 10/22/2019    Pfizer Purple Cap SARS-CoV-2 03/25/2021, 04/15/2021    Pneumococcal polysaccharide vaccine, 23-valent, age 2 years and older (PNEUMOVAX 23) 10/13/2016    Td (adult), unspecified 01/01/1999    Tdap vaccine, age 7 year and older (BOOSTRIX, ADACEL) 04/12/2012, 10/23/2015    Varicella vaccine, subcutaneous (VARIVAX) 07/15/2002       Medications and Allergies     Current Outpatient Medications   Medication Instructions     albuterol 90 mcg/actuation inhaler 2 puffs, inhalation, Every 4 hours PRN    albuterol 2.5 mg, nebulization, Every 6 hours PRN    alpha lipoic acid 600 mg capsule 2 capsules, oral, 3 times daily    amLODIPine (NORVASC) 5 mg, oral, Daily    aspirin 81 mg, oral, Daily    atorvastatin (LIPITOR) 10 mg, oral, Daily    buPROPion XL (Wellbutrin XL) 150 mg 24 hr tablet 1 tablet, Every morning    clonazePAM (KLONOPIN) 0.5 mg, Daily PRN    coenzyme Q-10 200 mg, oral, 3 times daily    estradiol (ESTRACE) 1 g, vaginal, 3 times weekly    fluticasone (Flonase Allergy Relief) 50 mcg/actuation nasal spray 1 spray, Daily    gabapentin (NEURONTIN) 300 mg, oral, 3 times daily    ibuprofen 800 mg, oral, 3 times daily    levothyroxine (SYNTHROID, LEVOXYL) 125 mcg, oral, Daily    LORazepam (Ativan) 2 mg tablet Take 1 p.o. 1 hour before the procedure    LORazepam (Ativan) 2 mg tablet Take 1 p.o. 1 hour before the procedure    losartan (COZAAR) 100 mg, oral, Daily    nicotine (Nicoderm CQ) 14 mg/24 hr patch 1 patch, transdermal, Every 24 hours    nicotine polacrilex (NICORETTE) 4 mg, Mouth/Throat, As needed    omeprazole (PRILOSEC) 20 mg, Daily before breakfast    SUMAtriptan (IMITREX) 50 mg    traZODone (Desyrel) 50 mg tablet 0.5-1 tablets, Nightly PRN    vitamin B complex (SUPER B-50 COMPLEX ORAL) 1 tablet, 2 times daily    vitamin B complex (Vitamins B Complex) tablet 1 tablet, oral, 2 times daily        Allergies:  Lisinopril    Social History   She reports that she has been smoking cigarettes. She has a 17.5 pack-year smoking history. She has been exposed to tobacco smoke. She has never used smokeless tobacco. She reports that she does not currently use alcohol after a past usage of about 1.0 standard drink of alcohol per week. She reports current drug use. Drug: Marijuana.    Smoking History: ***  Exposure/Job History: ***    Family History   Family History[2]      Surgical History   She has a past surgical history that includes  Other surgical history (10/28/2020); Other surgical history (01/04/2022); and Joint replacement.    Physical Exam   There were no vitals filed for this visit.      Physical Exam    Results   Pulmonary Function Tests:  None    Chest Radiograph:  XR chest 1 view 04/10/2025    Impression  1. New patchy left lower and right mid lung airspace opacities  concerning for pneumonia.      Chest CT Scan:  CT angio chest for pulmonary embolism 04/10/2025    Impression  1. No evidence of acute pulmonary embolism to the level of the  segmental pulmonary arteries.  2. Patchy consolidation in the right upper lobe and minimal  ground-glass opacity in the right lower lobe raising suspicion for  multifocal pneumonia. Recommend follow-up chest CT in 3 months to  ensure resolution.  3. Extensive pulmonary emphysema.           ECHO:  No results found for this or any previous visit from the past 365 days.       Labs:  Lab Results   Component Value Date    WBC 11.7 (H) 04/10/2025    HGB 12.5 04/10/2025    HCT 34.5 (L) 04/10/2025    MCV 85 04/10/2025     04/10/2025       Lab Results   Component Value Date    CREATININE 0.70 04/10/2025    BUN 7 04/10/2025     04/10/2025    K 3.3 (L) 04/10/2025     (H) 04/10/2025    CO2 22 04/10/2025        Lab Results   Component Value Date    RF 10 02/03/2021    SEDRATE 16 02/25/2022        IgE: None  Respiratory Allergy Panel: None  AEC:   Eosinophils Absolute (x10*3/uL)   Date Value   04/10/2025 0.00     Eosinophils % (%)   Date Value   04/10/2025 0.0       Cultures:  None       Assessment and Plan     Miguel Angel Dominguez is a 54 y.o. female with PMH fibromyalgia, HTN, who presents today for NPV for emphysema seen on CT. No PFT in the chart but expect there is a component of COPD present.    #emphysema  #probable COPD    -obtain full PFT and 6MWT whenever stable  -start LAMA-LABA ***    Follow-up:      Pallavi Sharma, MD       [1]   Past Medical History:  Diagnosis Date    Abdominal pain,  right lower quadrant 06/27/2006    Formatting of this note might be different from the original. Chronic intemittent since 10-05    Anxiety     Arthritis     Cardiomyopathy     Depression     Disease of thyroid gland     Fibromyalgia 2023    GERD (gastroesophageal reflux disease)     Heart disease     Heart murmur     Hypertension     Hyperthyroidism     Primary osteoarthritis of left hip 07/12/2019    Formatting of this note might be different from the original. Added automatically from request for surgery [id number]   [2]   Family History  Problem Relation Name Age of Onset    Cardiomyopathy Father Nilo BIRMINGHAM Williams     Prostate cancer Father Nilo BIRMINGHAM Williams     Heart attack Father Nilo Freedhrcheko     Cancer Father Nilo Freedhrcheko     Diabetes Maternal Grandmother Heart attacks     Heart disease Maternal Grandmother Heart attacks     Breast cancer Paternal Grandmother Will B Ronald     Diabetes Mother's Sister Rosamaria Mathis     Heart disease Mother Ofe Dominguez     Cancer Brother German Dominguez     Hypertension Brother Lillian Dominguez

## 2025-05-23 ENCOUNTER — TELEPHONE (OUTPATIENT)
Dept: OTHER | Facility: CLINIC | Age: 55
End: 2025-05-23
Payer: COMMERCIAL

## 2025-05-23 NOTE — TELEPHONE ENCOUNTER
Spoke with patient with reminder that the required COVID Recovery questionnaires were not completed as yet.  Patient was given a deadline of 5/26/25 end of business day to complete the forms to avoid the cancellation of the appointment.  If forms are not completed, appointment will be cancelled and the patient can call the office to reschedule if needed.

## 2025-05-26 ASSESSMENT — ANXIETY QUESTIONNAIRES
6. BECOMING EASILY ANNOYED OR IRRITABLE: SEVERAL DAYS
GAD7 TOTAL SCORE: 10
2. NOT BEING ABLE TO STOP OR CONTROL WORRYING: SEVERAL DAYS
6. BECOMING EASILY ANNOYED OR IRRITABLE: SEVERAL DAYS
7. FEELING AFRAID AS IF SOMETHING AWFUL MIGHT HAPPEN: SEVERAL DAYS
3. WORRYING TOO MUCH ABOUT DIFFERENT THINGS: SEVERAL DAYS
4. TROUBLE RELAXING: MORE THAN HALF THE DAYS
IF YOU CHECKED OFF ANY PROBLEMS ON THIS QUESTIONNAIRE, HOW DIFFICULT HAVE THESE PROBLEMS MADE IT FOR YOU TO DO YOUR WORK, TAKE CARE OF THINGS AT HOME, OR GET ALONG WITH OTHER PEOPLE: EXTREMELY DIFFICULT
4. TROUBLE RELAXING: MORE THAN HALF THE DAYS
5. BEING SO RESTLESS THAT IT IS HARD TO SIT STILL: SEVERAL DAYS
IF YOU CHECKED OFF ANY PROBLEMS ON THIS QUESTIONNAIRE, HOW DIFFICULT HAVE THESE PROBLEMS MADE IT FOR YOU TO DO YOUR WORK, TAKE CARE OF THINGS AT HOME, OR GET ALONG WITH OTHER PEOPLE: EXTREMELY DIFFICULT
3. WORRYING TOO MUCH ABOUT DIFFERENT THINGS: SEVERAL DAYS
7. FEELING AFRAID AS IF SOMETHING AWFUL MIGHT HAPPEN: SEVERAL DAYS
2. NOT BEING ABLE TO STOP OR CONTROL WORRYING: SEVERAL DAYS
1. FEELING NERVOUS, ANXIOUS, OR ON EDGE: NEARLY EVERY DAY
1. FEELING NERVOUS, ANXIOUS, OR ON EDGE: NEARLY EVERY DAY
5. BEING SO RESTLESS THAT IT IS HARD TO SIT STILL: SEVERAL DAYS

## 2025-05-27 ENCOUNTER — TELEMEDICINE CLINICAL SUPPORT (OUTPATIENT)
Dept: OTHER | Facility: CLINIC | Age: 55
End: 2025-05-27
Payer: COMMERCIAL

## 2025-05-27 DIAGNOSIS — Z86.16 PERSONAL HISTORY OF COVID-19: ICD-10-CM

## 2025-05-27 SDOH — ECONOMIC STABILITY: FOOD INSECURITY: WITHIN THE PAST 12 MONTHS, YOU WORRIED THAT YOUR FOOD WOULD RUN OUT BEFORE YOU GOT MONEY TO BUY MORE.: SOMETIMES TRUE

## 2025-05-27 SDOH — ECONOMIC STABILITY: INCOME INSECURITY: HOW HARD IS IT FOR YOU TO PAY FOR THE VERY BASICS LIKE FOOD, HOUSING, MEDICAL CARE, AND HEATING?: VERY HARD

## 2025-05-27 SDOH — ECONOMIC STABILITY: FOOD INSECURITY: WITHIN THE PAST 12 MONTHS, THE FOOD YOU BOUGHT JUST DIDN'T LAST AND YOU DIDN'T HAVE MONEY TO GET MORE.: OFTEN TRUE

## 2025-05-27 SDOH — ECONOMIC STABILITY: INCOME INSECURITY: IN THE LAST 12 MONTHS, WAS THERE A TIME WHEN YOU WERE NOT ABLE TO PAY THE MORTGAGE OR RENT ON TIME?: YES

## 2025-05-27 SDOH — ECONOMIC STABILITY: TRANSPORTATION INSECURITY
IN THE PAST 12 MONTHS, HAS THE LACK OF TRANSPORTATION KEPT YOU FROM MEDICAL APPOINTMENTS OR FROM GETTING MEDICATIONS?: NO

## 2025-05-27 SDOH — ECONOMIC STABILITY: TRANSPORTATION INSECURITY
IN THE PAST 12 MONTHS, HAS LACK OF TRANSPORTATION KEPT YOU FROM MEETINGS, WORK, OR FROM GETTING THINGS NEEDED FOR DAILY LIVING?: NO

## 2025-05-27 ASSESSMENT — LIFESTYLE VARIABLES
USE_ALCOHOL_TO_HELP_SLEEP: YES
DO_YOU_DRINK?: NO DIFFERENCE
AUDIT-C TOTAL SCORE: 1
HOW MANY STANDARD DRINKS CONTAINING ALCOHOL DO YOU HAVE ON A TYPICAL DAY: 1 OR 2
HOW OFTEN DO YOU HAVE SIX OR MORE DRINKS ON ONE OCCASION: NEVER
SKIP TO QUESTIONS 9-10: 1
HOW OFTEN DO YOU HAVE A DRINK CONTAINING ALCOHOL: MONTHLY OR LESS

## 2025-05-27 ASSESSMENT — MONTREAL COGNITIVE ASSESSMENT (MOCA)
11. FOR EACH PAIR OF WORDS, WHAT CATEGORY DO THEY BELONG TO (OUT OF 2): 2
8. SERIAL SUBTRACTION OF 7S: 3
VISUOSPATIAL/EXECUTIVE SUBSCORE: 0
4. NAME EACH OF THE THREE ANIMALS SHOWN: 0
9. REPEAT EACH SENTENCE: 2
6. READ LIST OF DIGITS [FORWARD/BACKWARD]: 2
12. MEMORY INDEX SCORE: 4
5. MEMORY TRIALS: 0
7. [VIGILENCE] TAP WHEN HEARING DESIGNATED LETTER: 1
WHAT IS THE TOTAL SCORE (OUT OF 30): 20
WHAT LEVEL OF EDUCATION WAS ATTAINED: 0
13. ORIENTATION SUBSCORE: 6
10. [FLUENCY] NAME WORDS STARTING WITH DESIGNATED LETTER: 0

## 2025-05-27 ASSESSMENT — SLEEP AND FATIGUE QUESTIONNAIRES
MY MOTIVATION IS LOWER WHEN I AM FATIGUED.: 7 STRONGLY AGREE
FATIGUE_INTERFERES_RESPONSIBILITIES: 7 STRONGLY AGREE
FATIGUE_MOST_DISABILING_SYMPTOM: 7 STRONGLY AGREE
MY MOTIVATION IS LOWER WHEN I AM FATIGUED.: 7 STRONGLY AGREE
FATIGUE_CAUSES_FREQUENT_PROBLEMTS: 7 STRONGLY AGREE
EASILY_FATIGUED: 7 STRONGLY AGREE
FATIGUE_INTERFERES_PHYSICAL_FUNCTIONING: 7 STRONGLY AGREE
VISUAL ANALOGUE FATIGUE SCALE (VAFS): 7
FATIGUE_MOST_DISABILING_SYMPTOM: 7 STRONGLY AGREE
FATIGUE_INTERFERES_RESPONSIBILITIES: 7 STRONGLY AGREE
FATIGUE_INTERFERES_SOCIAL_LIFE: 7 STRONGLY AGREE
MY FATIGUE PREVENTS SUSTAINED PHYSICAL FUNCTIONING.: 4
EXERCISE_BRINGS_ON_FATIGUE: 1 STRONGLY DISAGREE
FATIGUE_INTERFERES_PHYSICAL_FUNCTIONING: 7 STRONGLY AGREE
FATIGUE_CAUSES_FREQUENT_PROBLEMTS: 7 STRONGLY AGREE
EASILY_FATIGUED: 7 STRONGLY AGREE
MY FATIGUE PREVENTS SUSTAINED PHYSICAL FUNCTIONING.: 4
FATIGUE_INTERFERES_SOCIAL_LIFE: 7 STRONGLY AGREE
EXERCISE_BRINGS_ON_FATIGUE: 1 STRONGLY DISAGREE
AVERAGE_FSS_SCORE: 6

## 2025-06-02 ENCOUNTER — HOSPITAL ENCOUNTER (OUTPATIENT)
Dept: PAIN MEDICINE | Facility: CLINIC | Age: 55
Discharge: HOME | End: 2025-06-02
Payer: COMMERCIAL

## 2025-06-02 VITALS
TEMPERATURE: 97.2 F | BODY MASS INDEX: 28.17 KG/M2 | SYSTOLIC BLOOD PRESSURE: 126 MMHG | OXYGEN SATURATION: 99 % | RESPIRATION RATE: 16 BRPM | HEART RATE: 58 BPM | WEIGHT: 154 LBS | DIASTOLIC BLOOD PRESSURE: 83 MMHG

## 2025-06-02 DIAGNOSIS — M70.61 TROCHANTERIC BURSITIS OF BOTH HIPS: ICD-10-CM

## 2025-06-02 DIAGNOSIS — M70.62 TROCHANTERIC BURSITIS OF BOTH HIPS: ICD-10-CM

## 2025-06-02 PROCEDURE — 2500000005 HC RX 250 GENERAL PHARMACY W/O HCPCS: Performed by: ANESTHESIOLOGY

## 2025-06-02 PROCEDURE — 2500000004 HC RX 250 GENERAL PHARMACY W/ HCPCS (ALT 636 FOR OP/ED): Mod: JZ | Performed by: ANESTHESIOLOGY

## 2025-06-02 PROCEDURE — 2550000001 HC RX 255 CONTRASTS: Performed by: ANESTHESIOLOGY

## 2025-06-02 PROCEDURE — 77002 NEEDLE LOCALIZATION BY XRAY: CPT | Performed by: ANESTHESIOLOGY

## 2025-06-02 PROCEDURE — 20610 DRAIN/INJ JOINT/BURSA W/O US: CPT | Performed by: ANESTHESIOLOGY

## 2025-06-02 RX ORDER — LIDOCAINE HYDROCHLORIDE 5 MG/ML
INJECTION, SOLUTION INFILTRATION; INTRAVENOUS AS NEEDED
Status: COMPLETED | OUTPATIENT
Start: 2025-06-02 | End: 2025-06-02

## 2025-06-02 RX ORDER — BUPIVACAINE HYDROCHLORIDE 7.5 MG/ML
INJECTION, SOLUTION EPIDURAL; RETROBULBAR AS NEEDED
Status: COMPLETED | OUTPATIENT
Start: 2025-06-02 | End: 2025-06-02

## 2025-06-02 RX ORDER — SODIUM CHLORIDE 9 MG/ML
INJECTION, SOLUTION INTRAMUSCULAR; INTRAVENOUS; SUBCUTANEOUS AS NEEDED
Status: COMPLETED | OUTPATIENT
Start: 2025-06-02 | End: 2025-06-02

## 2025-06-02 RX ORDER — TRIAMCINOLONE ACETONIDE 40 MG/ML
INJECTION, SUSPENSION INTRA-ARTICULAR; INTRAMUSCULAR AS NEEDED
Status: COMPLETED | OUTPATIENT
Start: 2025-06-02 | End: 2025-06-02

## 2025-06-02 RX ADMIN — IOHEXOL 3 ML: 300 INJECTION, SOLUTION INTRAVENOUS at 14:38

## 2025-06-02 RX ADMIN — BUPIVACAINE HYDROCHLORIDE 10 ML: 7.5 INJECTION, SOLUTION EPIDURAL; RETROBULBAR at 14:38

## 2025-06-02 RX ADMIN — LIDOCAINE HYDROCHLORIDE 10 ML: 5 INJECTION, SOLUTION INFILTRATION at 14:38

## 2025-06-02 RX ADMIN — TRIAMCINOLONE ACETONIDE 40 MG: 40 INJECTION, SUSPENSION INTRA-ARTICULAR; INTRAMUSCULAR at 14:39

## 2025-06-02 RX ADMIN — SODIUM CHLORIDE 10 ML: 9 INJECTION, SOLUTION INTRAMUSCULAR; INTRAVENOUS; SUBCUTANEOUS at 14:38

## 2025-06-02 ASSESSMENT — PAIN - FUNCTIONAL ASSESSMENT: PAIN_FUNCTIONAL_ASSESSMENT: 0-10

## 2025-06-02 ASSESSMENT — PAIN SCALES - GENERAL
PAINLEVEL_OUTOF10: 3
PAINLEVEL_OUTOF10: 8

## 2025-06-02 NOTE — Clinical Note
Patient in room , Patient identified . Universal protocal , and preprocedure check list ,patient postioned , site prepped . Right and left Hip injection .

## 2025-06-03 NOTE — PROGRESS NOTES
NPV In-Person    Subjective   COVID-19 Infection Date:  11/1/2024 PCR confirmed (sx: (Patient-Rptd) Fever, Cough, Shortness of breath, Fatigue, Pain on breathing, Chest pain, Runny nose, Muscle pain, Brain fog, Sore throat, Sinus Pressure - was seen in ED, treated with prednisone and antibiotics)    COVID-19 vaccine status: (Patient-Rptd) Moderna 10/16193     Occupation: currently unable to work due to chronic illness     Current Providers: PCP YUNI Tran, Pain Management Dr. Riley, Cardiology Dr. Wilhelm, ENT CNP Means, Sleep Medicine Dr. Woods, Pulmonary Dr. Marc 7/16, GI Dr. Villalta 9/29    Survey Scores: 05/2025  PHQ-9: 21  ARIANNA-7: 10   Sleep Wellness: 7   FSS average: 6   Modified Ecog average: 3.17   MOCA: 20/22 (05/2025)  Overall Health: 60     54 y.o. female with a h/o COVID-19 as noted above, anxiety, depression, hypothyroidism, HTN, cardiomyopathy, provoked PE 2021 s/p eliquis, HLD, obesity, JYOTI, chronic pain, GERD, sickle cell trait, migraines, COPD, presents to [facility] care at the  [facility] with c/o Chronic pain, headaches, poor appetite and taste changes, post-nasal drainage, throat clearing, cough, shortness of breath, bruising, fatigue, poor sleep, brain fog    Believe she had COVID in December 2019 as well  PASC symptoms started after COVID-19 illness in November 2024  Has not been feeling the same since COVID, every problem she had prior to COVID is now worse  Fibromyalgia flares are more severe and more frequent  Pain is all over her body, may have a flare up a few times per week  Flare ups are debilitating because she is stuck in the bed, even when not in a flare then still has significant pain, also OA  IV infusion therapy has not worked for her pain  Does not feel anything has worked, nothing takes the pain away, extra strength or half Vicodin takes the edge off  Tries not to take more than half a Vicodin per day  Celebrex as needed helps with hip  arthritis  Gabapentin and tylenol are not addressing inflammation pain, at times takes ibuprofen  PCP told her she can alternate ibuprofen and celebrex as needed  Taking Gabapentin 300mg as needed with Tylenol, taking it three times per day made her too sleepy  Getting Vicodin from father, pain management is not prescribing for her  Psychiatry at [facility] prescribed clonazepam  Anxiety has not been as bad, had a lot when her brother passed away, she was his caregiver  Has gone through counseling and therapy, hasn't taken clonazepam in a long time because her anxiety doesn't get that high anymore  Headaches often, waking up with them, sometimes coming during the day  Often in her forehead and in eyes, not usually in the back unless her neck is bothering her which has not been present  Describes pain as aching, sometimes light throbbing when in her eyes  When headache is in her eyes then it bothers her vision, blurry but no floaters or shapes  Hx of bad ear infection post COVID, right ear, was seen by ENT and followed with them for a few months  Ears are still bothering, itching but no pain, ENT notes itching is good because it shows ears are healing  Can hear better since dried blood was removed but feels that hearing in the right is not as good as it was prior  No tinnitus  Appetite has been poor, has lost weight, at least 25lbs since COVID, sometimes goes to the evening without eating  Taste is off, bland or bad taste, smell is normal  Rare nausea, acid reflux has not been bothersome  Having post-nasal drainage often, no sore throat usually, no dysphagia  Throat clearing, voice changes at times  Dry cough comes and goes, sometimes productive with white phlegm, sometimes yellow  Shortness of breath when walking too fast, mild, or when going up the stairs, feels a little faster heart beat and breathing but no SOB to where she would have to stop  Sometimes feels SOB when laying down and has to sit, not all the  time  No PND  Sometimes chest pain due to cardiomyopathy, not unusual for her, doesn't last long and not severe, used to it and does not seem worse than it has in the past, typically musculoskeletal  No palpitations but sometimes missed beat  Suffering from constipation, using suppositories as needed, constipation is at baseline  No abnormal bleeding, has been noticing bruises and unsure where that came from, arms and legs  Also had bruise on her breast one time  No rashes, itching, sores  Swelling in her knee but no edema otherwise  Has been having pain in her legs from the knees down  In April was diagnosed with pneumonia  Very fatigued all the time, never got back to normal, fatigue has been worse since COVID, present all the time,  Sleep is poor, pain wakes her up, has an adjustable bed with low gravity which she uses to help her go back to sleep  Tries to avoid taking pain medicine at night, uses it during ht day to help her function  No nodding off but struggling to stay awake, very sleepy  Hx of sleep apnea, was found to have apnea only when on back, she has not been sleeping on her side since she got adjustable bed  CPAP made a difference for her in the past, hard to sleep with it though  Brain fog all the time, forgetful, problems with attention, not comprehending when reading  Brain fog got worse with COVID  Feels down at times, stays to stay positive, has two adopted daughters 12 and 12yo  Also taking care of 4mo foster baby, daughters are helping her with the baby  Sometimes chiropractic treatments are helpful, hard to get in for appointments  Acupuncture helped her a few times but last time was painful so did not return  Aquatherapy helped in the past, tries to get to the pool to exercise, thinking about pool in the backyard this summer  No dizziness, no numbness or tingling  Quit smoking two months ago    Reviewed regulation surrounding controlled substance prescribing and usage with patient, advised  not to take Vicodin if not prescribed to her    full ROS completed and all negative unless noted in HPI     Relevant prior healthcare visits:  -05/2025 PCP notes JYOTI is not treated, prescribed imitrex for migraines  -03/2025 [facility] chiropractic visit  -02/2025 Cardiology notes stable presentation from CV perspective, LC suspected to contribute to symptoms  -02/2025 ENT for cerumen impaction  -02/2025 Pain Management notes patient failed IV infusion therapy but responding well to chiropractic care and acupuncture, recommends pool therapy, steroid injection  -02/2025 [facility] acupuncture visit  -01/2025 Psychiatry [facility] continued clonazepam and wellbutrin  -04/2024 Sleep Medicine notes mild-moderate sleep apnea is more normalized with side sleeping, trial CPAP    Relevant prior diagnostic studies:  -04/2025 DVT scan BLE negative  -04/2025 CTA chest shows no PE, patchy consolidation in the right upper lobe and minimal ground-glass opacity in the right lower lobe raising suspicion for multifocal pneumonia, extensive pulmonary emphysema  -08/2024 echocardiogram with LVEF 58%, impaired relaxation pattern of LV diastolic filling, normal RVSP  -04/2024 PSG shows moderate JYOTI with O2 eneida 84%  -06/2021 nuclear stress test normal    Relevant prior laboratory values, unremarkable unless noted:  -04/2025 D-Dimer 1612, Bnp, Troponin, CMP, CBC/D  -02/2024 TSH, Vitamin D 27, HbA1c  -05/2023 heptatis screen, antimitochondrial ab, alpha-1 antitrypsin, ferritin, ceruloplasmin, iron, anti-smooth muscle ab positive    Exercise routine:  Diet:  Weight hx: pre-COVID-19 (Patient-Rptd) 165  lbs -> post-COVID-19 (Patient-Rptd) 140  lbs  Substance use: former tobacco use, 1-2 servings ETOH monthly or less   Social:     Current Medications[1]    Medical History[2]    Surgical History[3]    Family History[4]    Objective   /72 (BP Location: Right leg, Patient Position: Sitting, BP Cuff Size: Adult)   Pulse 74   Temp 36.3 °C (97.3 °F)  "(Temporal)   Ht 1.575 m (5' 2\")   Wt 65.3 kg (144 lb)   LMP  (LMP Unknown)   SpO2 98%   BMI 26.34 kg/m²     Physical Exam    Assessment/Plan   Problem List Items Addressed This Visit           ICD-10-CM       High    Long COVID - Primary U09.9    06/2025:  Chronic pain, headaches, poor appetite and taste changes, post-nasal drainage, throat clearing, cough, shortness of breath, bruising, fatigue, poor sleep, brain fog  -comprehensive blood work, please have this drawn in the morning, fasting except for water   -use Flonase nose spray daily to help with post-nasal drainage, that is probably contributing to your cough and throat clearing  -follow up with pulmonary as scheduled  -experiment with side sleeping again, make a follow up visit with Dr. Woods  -for constipation, you can use Miralax once daily  -follow up with chiropractor for chronic pain  -referral to aquatherapy for chronic pain  -to learn more about LDN, visit the websites [email or web address] and www.[email or web address]   -additional recommendations provided under \"Patient Education\" section              Relevant Orders    Tryptase    Serum Protein Electrophoresis    Vitamin B1, Whole Blood    Vitamin B6    Cortisol AM    MIKE with Reflex to GERARD    C-Reactive Protein    Ferritin    Folate    Hemoglobin A1C    Rheumatoid Factor    Sedimentation Rate    Iron and TIBC    Citrulline Antibody, IgG    B-Type Natriuretic Peptide    Creatine Kinase    Vitamin B12    Vitamin D 25-Hydroxy,Total (for eval of Vitamin D levels)    Magnesium    Folate    Histamine, Plasma       Medium    Fibromyalgia M79.7    Relevant Orders    Referral to Physical Therapy     Other Visit Diagnoses         Codes      Post covid-19 condition, unspecified     U09.9      Post-acute sequelae of COVID-19 (PASC)     U09.9    Relevant Orders    Referral to Physical Therapy                 [1]   Current Outpatient Medications:     amLODIPine (Norvasc) 5 mg tablet, Take 1 tablet (5 " mg) by mouth once daily., Disp: 90 tablet, Rfl: 3    aspirin 81 mg EC tablet, TAKE 1 TABLET BY MOUTH EVERY DAY, Disp: 90 tablet, Rfl: 3    atorvastatin (Lipitor) 10 mg tablet, Take 1 tablet (10 mg) by mouth once daily., Disp: 90 tablet, Rfl: 3    buPROPion XL (Wellbutrin XL) 150 mg 24 hr tablet, Take 1 tablet (150 mg) by mouth once daily in the morning., Disp: , Rfl:     clonazePAM (KlonoPIN) 0.5 mg tablet, Take 1 tablet (0.5 mg) by mouth once daily as needed., Disp: , Rfl:     coenzyme Q-10 200 mg capsule, TAKE 1 CAPSULE BY MOUTH THREE TIMES A DAY, Disp: 90 capsule, Rfl: 11    estradiol (Estrace) 0.01 % (0.1 mg/gram) vaginal cream, Insert 0.25 Applicatorfuls (1 g) into the vagina 3 (three) times a week., Disp: 36 g, Rfl: 3    fluticasone (Flonase Allergy Relief) 50 mcg/actuation nasal spray, Administer 1 spray into each nostril once daily., Disp: , Rfl:     gabapentin (Neurontin) 300 mg capsule, TAKE 1 CAPSULE BY MOUTH THREE TIMES A DAY, Disp: 90 capsule, Rfl: 10    ibuprofen 800 mg tablet, TAKE 1 TABLET (800 MG) BY MOUTH 3 TIMES A DAY., Disp: 30 tablet, Rfl: 35    levothyroxine (Synthroid, Levoxyl) 125 mcg tablet, Take 1 tablet (125 mcg) by mouth once daily., Disp: 90 tablet, Rfl: 3    losartan (Cozaar) 100 mg tablet, Take 1 tablet (100 mg) by mouth once daily., Disp: 90 tablet, Rfl: 3    omeprazole (PriLOSEC) 20 mg DR capsule, Take 1 capsule (20 mg) by mouth once daily in the morning. Take before meals. Do not crush or chew., Disp: , Rfl:     traZODone (Desyrel) 50 mg tablet, Take 0.5-1 tablets (25-50 mg) by mouth as needed at bedtime., Disp: , Rfl:     vitamin B complex (SUPER B-50 COMPLEX ORAL), Take 1 tablet by mouth 2 times a day., Disp: , Rfl:     vitamin B complex (Vitamins B Complex) tablet, TAKE 1 TABLET BY MOUTH TWICE A DAY, Disp: 180 tablet, Rfl: 3    albuterol 2.5 mg /3 mL (0.083 %) nebulizer solution, Take 3 mL (2.5 mg) by nebulization every 6 hours if needed for wheezing., Disp: 3 mL, Rfl: 0     albuterol 90 mcg/actuation inhaler, Inhale 2 puffs every 4 hours if needed for wheezing., Disp: 18 g, Rfl: 0  [2]   Past Medical History:  Diagnosis Date    Abdominal pain, right lower quadrant 06/27/2006    Formatting of this note might be different from the original. Chronic intemittent since 10-05    Anxiety     Arthritis     Cardiomyopathy     Depression     Disease of thyroid gland     Fibromyalgia 2023    GERD (gastroesophageal reflux disease)     Heart disease     Heart murmur     Hypertension     Hyperthyroidism     Primary osteoarthritis of left hip 07/12/2019    Formatting of this note might be different from the original. Added automatically from request for surgery [id number]   [3]   Past Surgical History:  Procedure Laterality Date    JOINT REPLACEMENT      OTHER SURGICAL HISTORY  10/28/2020    Ectopic pregnancy removal    OTHER SURGICAL HISTORY  01/04/2022    Hip replacement   [4]   Family History  Problem Relation Name Age of Onset    Cardiomyopathy Father Nilo BIRMINGHAM Williams     Prostate cancer Father Nilo VINNIE Kramer     Heart attack Father Nilo BIRMINGHAM Gathrcheko     Cancer Father Nilo VINNIE Kramer     Diabetes Maternal Grandmother Heart attacks     Heart disease Maternal Grandmother Heart attacks     Breast cancer Paternal Grandmother Will B Ronald     Diabetes Mother's Sister Rosamaria Mathis     Heart disease Mother Ofe Dominguez     Cancer Brother German Dominguez     Hypertension Brother Lillian Dominguez

## 2025-06-05 ENCOUNTER — OFFICE VISIT (OUTPATIENT)
Dept: OTHER | Facility: CLINIC | Age: 55
End: 2025-06-05
Payer: COMMERCIAL

## 2025-06-05 VITALS
SYSTOLIC BLOOD PRESSURE: 114 MMHG | BODY MASS INDEX: 26.5 KG/M2 | DIASTOLIC BLOOD PRESSURE: 72 MMHG | TEMPERATURE: 97.3 F | HEIGHT: 62 IN | WEIGHT: 144 LBS | HEART RATE: 74 BPM | OXYGEN SATURATION: 98 %

## 2025-06-05 DIAGNOSIS — U09.9 LONG COVID: Primary | ICD-10-CM

## 2025-06-05 DIAGNOSIS — U09.9 POST COVID-19 CONDITION, UNSPECIFIED: ICD-10-CM

## 2025-06-05 DIAGNOSIS — M79.7 FIBROMYALGIA: ICD-10-CM

## 2025-06-05 DIAGNOSIS — U09.9 POST-ACUTE SEQUELAE OF COVID-19 (PASC): ICD-10-CM

## 2025-06-05 PROCEDURE — 99215 OFFICE O/P EST HI 40 MIN: CPT | Performed by: NURSE PRACTITIONER

## 2025-06-05 PROCEDURE — 3008F BODY MASS INDEX DOCD: CPT | Performed by: NURSE PRACTITIONER

## 2025-06-05 PROCEDURE — 99205 OFFICE O/P NEW HI 60 MIN: CPT | Performed by: NURSE PRACTITIONER

## 2025-06-05 PROCEDURE — 99417 PROLNG OP E/M EACH 15 MIN: CPT | Performed by: NURSE PRACTITIONER

## 2025-06-05 ASSESSMENT — PAIN SCALES - GENERAL: PAINLEVEL_OUTOF10: 6

## 2025-06-05 NOTE — ASSESSMENT & PLAN NOTE
"06/2025:  Chronic pain, headaches, poor appetite and taste changes, post-nasal drainage, throat clearing, cough, shortness of breath, bruising, fatigue, poor sleep, brain fog  -comprehensive blood work, please have this drawn in the morning, fasting except for water   -use Flonase nose spray daily to help with post-nasal drainage, that is probably contributing to your cough and throat clearing  -follow up with pulmonary as scheduled  -experiment with side sleeping again, make a follow up visit with Dr. Woods  -for constipation, you can use Miralax once daily  -follow up with chiropractor for chronic pain  -referral to aquatherapy for chronic pain  -to learn more about LDN, visit the websites [email or web address] and www.[email or web address]   -additional recommendations provided under \"Patient Education\" section       " Yes

## 2025-06-05 NOTE — PATIENT INSTRUCTIONS
It was my pleasure seeing you in the [facility] today.  We will focus on addressing the following symptoms discussed today: Chronic pain, headaches, poor appetite and taste changes, post-nasal drainage, throat clearing, cough, shortness of breath, bruising, fatigue, poor sleep, brain fog    My recommendations are as follows:  -comprehensive blood work, please have this drawn in the morning, fasting except for water   -use Flonase nose spray daily to help with post-nasal drainage, that is probably contributing to your cough and throat clearing  -follow up with pulmonary as scheduled  -experiment with side sleeping again, make a follow up visit with Dr. Woods  -for constipation, you can use Miralax once daily  -follow up with chiropractor for chronic pain  -referral to aquatherapy for chronic pain  -to learn more about LDN, visit the websites www.[email or web address].org and www.ldnresearchtrust.org    Tips to help improve brain fog and fatigue:  --avoid drinking Alcohol while recovering from Long COVID  --Focus on eating whole foods to help support your gut and immune system. Aim to eat 30 different plants per week (vegetables, fruits, beans, nuts, legumes, seeds, whole grains, herbs, spices). Avoid processed foods and beverages. Eliminate added sugars, artificial sweeteners, processed oils, artificial dyes.  --ensure to practice 30 minutes of exercise 7 days per week to keep BNDGF (brain derived neurotrophic growth factor) elevated as this will help in the regeneration of neurons, you may split exercise time up into 5 minute increments if this is better tolerated.   --slowly increase your activity by no more than 10% per week, rest when you feel tired  --utilize pacing techniques to manage fatigue, schedule rest times throughout the day so you do not run out of energy, more information to be found on this here: http://www.phsa.ca/health-info-site/Documents/post_covid-19_fatigue.pdf  --use the “attention  "beam” strategy to help you focus on some things while ignoring others. Imagine a flashlight beam illuminating the task that you are trying to focus on while leaving everything else in the dark.  --minimize external distractions to help with attention. For example, turn off the TV, radio, music, heater, and other electrical equipment, and close the window to screen out traffic noise. Complete important or difficult tasks in a quiet room if possible. Switch off mobile phones, switch off automatic email notifications. Use ear plugs if necessary or noise-cancelling headphones. Reduce visual distractions by clearing off your work-space, sit opposite to a window. Make sure lighting in the workspace is adequate.  --minimize internal distractions to help with attention. Thoughts, feelings, and physical sensations such as hunger or pain can be distracting. When you notice your attention beam is directed toward a thought or sensation, gently direct your attention back to the central focal point. You can also practice mindfulness and/or meditation to help reduce internal distractions  --games that can be tried to help improve memory and attention are Brain HQ and N-Back games  --mindfulness and meditation can be learned with the smartphone apps “Unwinding Anxiety” and “Headspace”  --Review the  Health Talk on Managing Fatigue and Thinking Changes after COVID-19 here: https://www.hospitals.org/Health-Talks/articles/2022/05/managing-fatigue-and-thinking-changes-after-covid-19  --You can also find many helpful tips and tricks in this book: \"The Long COVID self-help guide, practical ways to manage symptoms\" by [employer] from the [facility] [place]  --additional apps, books, and podcasts for patients suffering from Long COVID can be found at https://www.[facility]/public-health/long-covid/long-covid-apps-books-podcasts/     To help improve sleep:  --try Melatonin children's liquid drops 1-2mg " underneath your tongue 30 minutes before you go to bed  --go to bed at the same time each night and get up at the same time each morning, including the weekend  --goal of 7-9 hours of uninterrupted sleep per night  --make sure your bedroom is quiet, dark, relaxing, and at a comfortable temperature  --remove electronic devices, such as TVs, computers, and smart phones, from your bedroom  --avoid caffeine after the morning and large meals before bed  --drink plenty of water throughout the day but avoid drinking fluids two hours before bed  --expose yourself to bright light in the morning  --engage in a calming bed-time routine of warm bath/shower, gratitude journal, guided meditation, etc.  --do not lay awake in bed for more than 20 minutes, get up and engage in a soothing activity such as reading a boring book until you feel sleepy     General headache recommendations:  -avoid common triggers which include red wine, dark beer, aged cheese, nuts, onions, chocolate, aspartame, processed meats and nitrates, excessive caffeine, caffeine withdrawal, fasting, skipping meals, barometric pressure change, bright light, poor air quality, odors  -avoid overuse of OTC medications such as Ibuprofen, Naproxen, Excedrin, etc. as this can lead to rebound headaches  -maintain a stable and consistent sleep schedule, even on weekends  -stay well-hydrated with non-caffeinated beverages  -avoid skipping r delaying meals  -aim for 30 minutes of movement per day, find something you enjoy so it doesn't feel like a chore  -Mind-Body and Stress Managements tools include acupuncture, chiropractic care, yoga, meditation, psychotherapy     We will send a message in [facility] or call you with the results of your tests.  Further recommendations will follow based on testing results and your symptoms.   Please return to [facility] in 3 months, call [phone] or send a message through your [facility] rain if needed.    If you are interested  in joining a clinical trial, look into the following resources:  https://clinicaltrials.gov/  [email or web address]  [email or web address]

## 2025-06-06 LAB
ALBUMIN SERPL-MCNC: 4.7 G/DL (ref 3.6–5.1)
ALP SERPL-CCNC: 65 U/L (ref 37–153)
ALT SERPL-CCNC: 20 U/L (ref 6–29)
ANION GAP SERPL CALCULATED.4IONS-SCNC: 9 MMOL/L (CALC) (ref 7–17)
AST SERPL-CCNC: 13 U/L (ref 10–35)
BILIRUB SERPL-MCNC: 0.5 MG/DL (ref 0.2–1.2)
BUN SERPL-MCNC: 15 MG/DL (ref 7–25)
CALCIUM SERPL-MCNC: 9.6 MG/DL (ref 8.6–10.4)
CHLORIDE SERPL-SCNC: 109 MMOL/L (ref 98–110)
CHOLEST SERPL-MCNC: 213 MG/DL
CHOLEST/HDLC SERPL: 2.7 (CALC)
CO2 SERPL-SCNC: 24 MMOL/L (ref 20–32)
CREAT SERPL-MCNC: 0.81 MG/DL (ref 0.5–1.03)
EGFRCR SERPLBLD CKD-EPI 2021: 86 ML/MIN/1.73M2
ERYTHROCYTE [DISTWIDTH] IN BLOOD BY AUTOMATED COUNT: 12.6 % (ref 11–15)
GLUCOSE SERPL-MCNC: 103 MG/DL (ref 65–99)
HCT VFR BLD AUTO: 39.7 % (ref 35–45)
HDLC SERPL-MCNC: 78 MG/DL
HGB BLD-MCNC: 13.1 G/DL (ref 11.7–15.5)
LDLC SERPL CALC-MCNC: 112 MG/DL (CALC)
MCH RBC QN AUTO: 31.3 PG (ref 27–33)
MCHC RBC AUTO-ENTMCNC: 33 G/DL (ref 32–36)
MCV RBC AUTO: 95 FL (ref 80–100)
NONHDLC SERPL-MCNC: 135 MG/DL (CALC)
PLATELET # BLD AUTO: 294 THOUSAND/UL (ref 140–400)
PMV BLD REES-ECKER: 10 FL (ref 7.5–12.5)
POTASSIUM SERPL-SCNC: 3.8 MMOL/L (ref 3.5–5.3)
PROT SERPL-MCNC: 7.8 G/DL (ref 6.1–8.1)
RBC # BLD AUTO: 4.18 MILLION/UL (ref 3.8–5.1)
SODIUM SERPL-SCNC: 142 MMOL/L (ref 135–146)
TRIGL SERPL-MCNC: 115 MG/DL
TSH SERPL-ACNC: 0.15 MIU/L
WBC # BLD AUTO: 9.7 THOUSAND/UL (ref 3.8–10.8)

## 2025-06-12 DIAGNOSIS — E03.9 ACQUIRED HYPOTHYROIDISM: Primary | ICD-10-CM

## 2025-06-12 DIAGNOSIS — E78.5 HYPERLIPIDEMIA, UNSPECIFIED: ICD-10-CM

## 2025-06-12 RX ORDER — ATORVASTATIN CALCIUM 20 MG/1
20 TABLET, FILM COATED ORAL DAILY
Qty: 90 TABLET | Refills: 3 | Status: SHIPPED | OUTPATIENT
Start: 2025-06-12 | End: 2026-06-12

## 2025-07-16 ENCOUNTER — OFFICE VISIT (OUTPATIENT)
Dept: PULMONOLOGY | Facility: CLINIC | Age: 55
End: 2025-07-16
Payer: COMMERCIAL

## 2025-07-16 VITALS
SYSTOLIC BLOOD PRESSURE: 127 MMHG | TEMPERATURE: 75 F | BODY MASS INDEX: 26.52 KG/M2 | WEIGHT: 145 LBS | HEART RATE: 91 BPM | DIASTOLIC BLOOD PRESSURE: 83 MMHG | OXYGEN SATURATION: 98 %

## 2025-07-16 DIAGNOSIS — J18.9 PNEUMONIA DUE TO INFECTIOUS ORGANISM, UNSPECIFIED LATERALITY, UNSPECIFIED PART OF LUNG: ICD-10-CM

## 2025-07-16 DIAGNOSIS — J18.9 COMMUNITY ACQUIRED PNEUMONIA, UNSPECIFIED LATERALITY: ICD-10-CM

## 2025-07-16 DIAGNOSIS — F17.209 NICOTINE DEPENDENCE WITH NICOTINE-INDUCED DISORDER, UNSPECIFIED NICOTINE PRODUCT TYPE: Primary | ICD-10-CM

## 2025-07-16 DIAGNOSIS — J44.89 EMPHYSEMA WITH CHRONIC BRONCHITIS (MULTI): ICD-10-CM

## 2025-07-16 PROCEDURE — 99212 OFFICE O/P EST SF 10 MIN: CPT | Performed by: STUDENT IN AN ORGANIZED HEALTH CARE EDUCATION/TRAINING PROGRAM

## 2025-07-16 PROCEDURE — 99204 OFFICE O/P NEW MOD 45 MIN: CPT | Performed by: STUDENT IN AN ORGANIZED HEALTH CARE EDUCATION/TRAINING PROGRAM

## 2025-07-16 RX ORDER — ALBUTEROL SULFATE 90 UG/1
2 INHALANT RESPIRATORY (INHALATION) EVERY 4 HOURS PRN
Qty: 18 G | Refills: 11 | Status: SHIPPED | OUTPATIENT
Start: 2025-07-16 | End: 2025-08-15

## 2025-07-16 RX ORDER — ALBUTEROL SULFATE 90 UG/1
2 INHALANT RESPIRATORY (INHALATION) EVERY 4 HOURS PRN
Qty: 18 G | Refills: 0 | Status: SHIPPED | OUTPATIENT
Start: 2025-07-16 | End: 2025-07-16 | Stop reason: ALTCHOICE

## 2025-07-16 RX ORDER — ALBUTEROL SULFATE 0.83 MG/ML
3 SOLUTION RESPIRATORY (INHALATION) ONCE
OUTPATIENT
Start: 2025-07-16 | End: 2025-07-16

## 2025-07-16 RX ORDER — NICOTINE POLACRILEX 2 MG/1
LOZENGE ORAL
Qty: 200 LOZENGE | Refills: 2 | Status: SHIPPED | OUTPATIENT
Start: 2025-07-16

## 2025-07-16 RX ORDER — TIOTROPIUM BROMIDE INHALATION SPRAY 3.12 UG/1
2 SPRAY, METERED RESPIRATORY (INHALATION) DAILY
Qty: 8 G | Refills: 11 | Status: SHIPPED | OUTPATIENT
Start: 2025-07-16 | End: 2026-07-16

## 2025-07-16 RX ORDER — ALBUTEROL SULFATE 90 UG/1
1 INHALANT RESPIRATORY (INHALATION) ONCE
OUTPATIENT
Start: 2025-07-16

## 2025-07-16 ASSESSMENT — PAIN SCALES - GENERAL: PAINLEVEL_OUTOF10: 6

## 2025-07-16 NOTE — PROGRESS NOTES
Department of Medicine I Division of Pulmonary, Critical Care, and Sleep Medicine   [id number] [place] [address]  Phone: [phone]  Fax: [phone]    History of Present Illness   Miguel Angel Dominguez is a 55 y.o. female presenting with dyspnea.    Referred by:  Shana Tran, APR*, for emphysema . I have independently interviewed and examined the patient in the office and reviewed available records.  History of provoked PE s/p RUTHANN in 2021 s/p eliquis , cardiomyopathy, covid 11/2024,  JYOTI, GERD, chronic pain      Since covid diagnosis has had several respiratory infections,  had 3-4  since her covid diagnosis requiring antibiotics   Denies any history asthma,  or copd  Notes shortness of breath when going up stairs   At night time may hear herself wheeze -will wake her up at night sometimes ;follows with sleep medicinee for her JYOTI , has trouble tolerating CPAP   Just established care with covid recovery clinic   Has a dry cough-not daily   Denies any orthopnea, has some trace pedal edema   Has an albuterol inh which she is using frequently throughout the day--it does help her symptoms     Review of Systems  Review of Systems  All other review of systems are negative and/or non-contributory.    Past Medical History   She has a past medical history of Abdominal pain, right lower quadrant (06/27/2006), Anxiety, Arthritis, Cardiomyopathy, Depression, Disease of thyroid gland, Fibromyalgia (2023), GERD (gastroesophageal reflux disease), Heart disease, Heart murmur, Hypertension, Hyperthyroidism, and Primary osteoarthritis of left hip (07/12/2019).      Immunizations     Immunization History   Administered Date(s) Administered    Flu vaccine (IIV4), preservative free *Check age/dose* 10/13/2016, 11/01/2018, 12/03/2019, 10/15/2020, 11/16/2021    Hepatitis B vaccine, adult *Check Product/Dose* 12/30/1999, 08/23/2001, 05/02/2002, 04/28/2003    Influenza, Unspecified 11/09/2012,  10/15/2020, 11/16/2021    Influenza, injectable, quadrivalent 10/09/2015, 10/13/2016    Influenza, seasonal, injectable 10/15/2020, 11/16/2021    MMR vaccine, subcutaneous (MMR II) 08/23/2001    Moderna COVID-19 vaccine, 12 years and older (50mcg/0.5mL)(Spikevax) 10/30/2024    Moderna SARS-CoV-2 Vaccination 12/31/2021    PPD Test 12/30/1999, 08/23/2001, 12/28/2001, 01/16/2003, 01/22/2003, 01/29/2003, 05/26/2004, 01/04/2006, 03/06/2007, 03/27/2007, 05/29/2015, 08/30/2017, 10/22/2019    Pfizer Purple Cap SARS-CoV-2 03/25/2021, 04/15/2021    Pneumococcal polysaccharide vaccine, 23-valent, age 2 years and older (PNEUMOVAX 23) 10/13/2016    Td (adult), unspecified 01/01/1999    Tdap vaccine, age 7 year and older (BOOSTRIX, ADACEL) 04/12/2012, 10/23/2015    Varicella vaccine, subcutaneous (VARIVAX) 07/15/2002       Medications and Allergies     Current Outpatient Medications   Medication Instructions    albuterol 90 mcg/actuation inhaler 2 puffs, inhalation, Every 4 hours PRN    albuterol 2.5 mg, nebulization, Every 6 hours PRN    amLODIPine (NORVASC) 5 mg, oral, Daily    aspirin 81 mg, oral, Daily    atorvastatin (LIPITOR) 20 mg, oral, Daily    buPROPion XL (Wellbutrin XL) 150 mg 24 hr tablet 1 tablet, Every morning    clonazePAM (KLONOPIN) 0.5 mg, Daily PRN    coenzyme Q-10 200 mg, oral, 3 times daily    estradiol (ESTRACE) 1 g, vaginal, 3 times weekly    fluticasone (Flonase Allergy Relief) 50 mcg/actuation nasal spray 1 spray, Daily    gabapentin (NEURONTIN) 300 mg, oral, 3 times daily    ibuprofen 800 mg, oral, 3 times daily    levothyroxine (SYNTHROID, LEVOXYL) 125 mcg, oral, Daily    losartan (COZAAR) 100 mg, oral, Daily    omeprazole (PRILOSEC) 20 mg, Daily before breakfast    traZODone (Desyrel) 50 mg tablet 0.5-1 tablets, Nightly PRN    vitamin B complex (SUPER B-50 COMPLEX ORAL) 1 tablet, 2 times daily    vitamin B complex (Vitamins B Complex) tablet 1 tablet, oral, 2 times daily      Lisinopril    Social  History   She reports that she has quit smoking. Her smoking use included cigarettes. She has a 17.5 pack-year smoking history. She has been exposed to tobacco smoke. She has never used smokeless tobacco. She reports that she does not currently use alcohol after a past usage of about 1.0 standard drink of alcohol per week. She reports current drug use. Drug: Marijuana.    Smoking History: is smoking 1pack every 3 days, started smoking age 13.  Did quit for 6 months.   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     Family History   Family History[1]  Father: lung cancer   Mother: passed at a young age, had heart condition but dont know what other conditions she may have had       Surgical History   She has a past surgical history that includes Other surgical history (10/28/2020); Other surgical history (01/04/2022); and Joint replacement.    Physical Exam     Vitals:    07/16/25 0833   BP: 127/83   Temp: (!) 23.9 °C (75 °F)   SpO2: 98%       Physical Exam  Vitals reviewed.   Constitutional:       General: She is awake.     Cardiovascular:      Rate and Rhythm: Normal rate and regular rhythm.   Pulmonary:      Effort: Pulmonary effort is normal.      Breath sounds: Normal breath sounds.   Abdominal:      General: Bowel sounds are normal.     Neurological:      Mental Status: She is alert and oriented to person, place, and time.     Psychiatric:         Attention and Perception: Attention and perception normal.         Behavior: Behavior normal.          Results   Pulmonary Function Tests:  None on file     Chest Radiograph:  XR chest 1 view 04/10/2025    Narrative  Interpreted By:  Watson Cano,  STUDY:  XR CHEST 1 VIEW;  4/10/2025 8:34 pm    INDICATION:  Signs/Symptoms:CP, SOB.    COMPARISON:  11/13/2024    ACCESSION NUMBER(S):  [id number]    ORDERING CLINICIAN:  AMANDA PERES    FINDINGS:      New  patchy left lower and right mid lung airspace opacities  concerning for pneumonia. Normal heart size no pleural effusion or  pneumothorax. No acute osseous abnormality.    Impression  1. New patchy left lower and right mid lung airspace opacities  concerning for pneumonia.    Signed by: Watson Cano 4/10/2025 9:08 PM  Dictation workstation:   [id number]      Chest CT Scan:  CT angio chest for pulmonary embolism 04/10/2025    Narrative  Interpreted By:  Timmy Aguero,  STUDY:  CT ANGIO CHEST FOR PULMONARY EMBOLISM;  4/10/2025 10:29 pm    INDICATION:  Signs/Symptoms:Chest pain, shortness of breath, history of  cardiomyopathy, elevated D-dimer.    COMPARISON:  Chest CT 07/23/2021    ACCESSION NUMBER(S):  [id number]    ORDERING CLINICIAN:  AMANDA PERES    TECHNIQUE:  Axial CT images of the chest obtained after administration of IV  contrast.. Maximum intensity projection images were created and  reviewed. In addition, dual energy reconstructions were also  performed including low energy mono-energetic images and iodine  density maps.    FINDINGS:  BONES: No acute osseous abnormality.  LOWER NECK: Thyroid gland is not visualized, either outside the field  of view or may be absent. CHEST WALL: Unremarkable.  UPPER ABDOMEN: Too small to characterize liver hypodensities,  statistically likely benign cysts.    VESSELS: Normal caliber of the aorta and its branches. Normal  appearance of the main pulmonary artery. No evidence of acute  pulmonary embolism to the level of the segmental pulmonary arteries  HEART: Mild cardiomegaly. LYMPH NODES: Unremarkable.  MEDIASTINUM/ESOPHAGUS: Tiny hiatal hernia.  LUNGS AND LARGE AIRWAYS: Diffuse centrilobular emphysema, worse in  the left upper lobe. Bibasilar atelectasis/scarring. Lingular  atelectasis/scarring, unchanged compared to prior. Minimal patchy  ground-glass opacities in the right lower lobe as well as patchy  consolidation in the right upper lobe. PLEURA: No pleural  "effusion or  pneumothorax.    Impression  1. No evidence of acute pulmonary embolism to the level of the  segmental pulmonary arteries.  2. Patchy consolidation in the right upper lobe and minimal  ground-glass opacity in the right lower lobe raising suspicion for  multifocal pneumonia. Recommend follow-up chest CT in 3 months to  ensure resolution.  3. Extensive pulmonary emphysema.    MACRO:  Critical Finding:  See findings. Notification was initiated on  4/10/2025 at 11:44 pm by  Timmy Aguero.  (**-YCF-**) Instructions:    Signed by: Timmy Aguero 4/10/2025 11:44 PM  Dictation workstation:   [id number]       ECHO:  No results found for this or any previous visit from the past 365 days.       Labs:  Lab Results   Component Value Date    WBC 9.7 06/05/2025    HGB 13.1 06/05/2025    HCT 39.7 06/05/2025    MCV 95.0 06/05/2025     06/05/2025       Lab Results   Component Value Date    CREATININE 0.81 06/05/2025    BUN 15 06/05/2025     06/05/2025    K 3.8 06/05/2025     06/05/2025    CO2 24 06/05/2025        Lab Results   Component Value Date    RF 10 02/03/2021    SEDRATE 16 02/25/2022        IgE: No results found for: \"IGE\"   Respiratory Allergy Panel:  AEC:   Eosinophils Absolute (x10*3/uL)   Date Value   04/10/2025 0.00     Eosinophils % (%)   Date Value   04/10/2025 0.0       Cultures:  No results found for: \"AFBCX\"   No results found for: \"RESPCULTCYFI\"    No results found for the last 90 days.  C     Assessment and Plan       Miguel Angel Dominguez is a 55 y.o. year old female patient with History of provoked PE s/p RUTHANN in 2021 s/p  3 months eliquis , cardiomyopathy follows with cardiology, covid 11/2024,  JYOTI, GERD, chronic pain here to establish care with pulmonary given emphysema on scans and repeated uri since her covid diagnsosis     Impression   -recurrent bronchitis following covid illness--?chronic bronchitis, underlying copd   -pneumonia in April   -emphysema   -current smoker     Plan "   -given persistent symptoms and increased utilization of OSIEL, will start LAMA (spiriva)  -PFT and walk test   -repeat CT chest now to ensure RUL infiltrate has improved   -smoking cessation--prescribed nicotine lozenge and quit line resources provided     RTC in 2-3 months or sooner if needed         Ayesha Marc MD  07/16/2025         [1]   Family History  Problem Relation Name Age of Onset    Cardiomyopathy Father Nilo Kramer     Prostate cancer Father Nilo Kramer     Heart attack Father Nilo Kramer     Cancer Father Nilo Kramer     Diabetes Maternal Grandmother Heart attacks     Heart disease Maternal Grandmother Heart attacks     Breast cancer Paternal Grandmother Will B Ronald     Diabetes Mother's Sister Rosamaria Brittany Mathis     Heart disease Mother Ofe Dominguez     Cancer Brother German Dominguez     Hypertension Brother Lillian Dominguez

## 2025-07-16 NOTE — PATIENT INSTRUCTIONS
Thank you for visiting the Pulmonary Clinic today.   Your breathing medications: start spiriva, continue albuterol as needed,  nicotine lozenge to help quit smoking   Tests: CT scan (can schedule through), breathing test (call  to schedule)   For resources to help quit smoking you can visit the [facility] website at [email or web address]  or call [phone] QUIT-NOW ([phone])  Return in 2-3 months   If you have questions or concerns, call [phone] (option 4)

## 2025-07-21 DIAGNOSIS — E03.9 ACQUIRED HYPOTHYROIDISM: ICD-10-CM

## 2025-07-24 ENCOUNTER — APPOINTMENT (OUTPATIENT)
Dept: PAIN MEDICINE | Facility: CLINIC | Age: 55
End: 2025-07-24
Payer: COMMERCIAL

## 2025-07-24 VITALS
SYSTOLIC BLOOD PRESSURE: 116 MMHG | HEART RATE: 61 BPM | BODY MASS INDEX: 26.68 KG/M2 | TEMPERATURE: 97.3 F | OXYGEN SATURATION: 98 % | RESPIRATION RATE: 10 BRPM | DIASTOLIC BLOOD PRESSURE: 77 MMHG | HEIGHT: 62 IN | WEIGHT: 145 LBS

## 2025-07-24 DIAGNOSIS — M54.50 LUMBAR PAIN: ICD-10-CM

## 2025-07-24 DIAGNOSIS — M79.10 MYALGIA: ICD-10-CM

## 2025-07-24 DIAGNOSIS — M79.7 FIBROMYALGIA: ICD-10-CM

## 2025-07-24 DIAGNOSIS — M70.61 TROCHANTERIC BURSITIS OF RIGHT HIP: Primary | ICD-10-CM

## 2025-07-24 DIAGNOSIS — F17.210 TOBACCO DEPENDENCE DUE TO CIGARETTES: ICD-10-CM

## 2025-07-24 PROBLEM — I26.99 PULMONARY EMBOLISM: Status: ACTIVE | Noted: 2025-07-24

## 2025-07-24 PROBLEM — G43.909 MIGRAINE HEADACHE: Status: ACTIVE | Noted: 2025-07-24

## 2025-07-24 PROBLEM — R05.3 PERSISTENT COUGH: Status: ACTIVE | Noted: 2025-07-24

## 2025-07-24 PROBLEM — J40 BRONCHITIS: Status: ACTIVE | Noted: 2025-07-24

## 2025-07-24 PROBLEM — I10 HYPERTENSION: Status: ACTIVE | Noted: 2021-06-28

## 2025-07-24 PROBLEM — N89.8 VAGINAL DISCHARGE: Status: ACTIVE | Noted: 2025-07-24

## 2025-07-24 PROBLEM — F43.21 GRIEF: Status: ACTIVE | Noted: 2022-01-26

## 2025-07-24 PROBLEM — R06.83 SNORING: Status: ACTIVE | Noted: 2025-07-24

## 2025-07-24 PROBLEM — M25.519 SHOULDER PAIN: Status: ACTIVE | Noted: 2025-07-24

## 2025-07-24 PROBLEM — R11.0 NAUSEA: Status: ACTIVE | Noted: 2025-07-24

## 2025-07-24 PROCEDURE — 3074F SYST BP LT 130 MM HG: CPT | Performed by: NURSE PRACTITIONER

## 2025-07-24 PROCEDURE — 3078F DIAST BP <80 MM HG: CPT | Performed by: NURSE PRACTITIONER

## 2025-07-24 PROCEDURE — 99215 OFFICE O/P EST HI 40 MIN: CPT | Performed by: NURSE PRACTITIONER

## 2025-07-24 PROCEDURE — 3008F BODY MASS INDEX DOCD: CPT | Performed by: NURSE PRACTITIONER

## 2025-07-24 ASSESSMENT — ENCOUNTER SYMPTOMS
CONFUSION: 0
NAUSEA: 0
DEPRESSION: 0
CHILLS: 0
SHORTNESS OF BREATH: 0
MYALGIAS: 1
FATIGUE: 0
WEAKNESS: 0
ARTHRALGIAS: 1
DIARRHEA: 0
CHEST TIGHTNESS: 0
DIZZINESS: 0
OCCASIONAL FEELINGS OF UNSTEADINESS: 1
BACK PAIN: 1
CONSTIPATION: 0
AGITATION: 0
WHEEZING: 0
HEADACHES: 0
LOSS OF SENSATION IN FEET: 0
PALPITATIONS: 0
FREQUENCY: 0

## 2025-07-24 ASSESSMENT — PAIN DESCRIPTION - DESCRIPTORS: DESCRIPTORS: ACHING

## 2025-07-24 ASSESSMENT — PAIN SCALES - GENERAL
PAINLEVEL_OUTOF10: 7
PAINLEVEL_OUTOF10: 7

## 2025-07-24 ASSESSMENT — PATIENT HEALTH QUESTIONNAIRE - PHQ9
2. FEELING DOWN, DEPRESSED OR HOPELESS: NOT AT ALL
SUM OF ALL RESPONSES TO PHQ9 QUESTIONS 1 & 2: 0
1. LITTLE INTEREST OR PLEASURE IN DOING THINGS: NOT AT ALL

## 2025-07-24 ASSESSMENT — PAIN - FUNCTIONAL ASSESSMENT: PAIN_FUNCTIONAL_ASSESSMENT: 0-10

## 2025-07-24 NOTE — PROGRESS NOTES
"Chief Complain  Follow-up for lower back pain radiating to right hip    History Of Present Illness  Miguel Angel Dominguez is a 55 y.o. female here for lower back pain radiating to right hip. The patient rates the pain at 7 on a scale from 0-10.  The patient describes pain as dull, aching, radiating.  The pain is worsened by bending forward, standing, walking, and squatting and is alleviated by medications nonsteroidal anti-inflammatory drugs, Tylenol, and analgesics, position change, sitting, and lying down.  Since the last visit the pain has improved.    The patient denies any fever, chills, weight loss, weakness, bladder/ bowel incontinence, history of cancer, history of IV drug abuse, recent trauma.     Prior office visit:  2/6/2025 Dr. Riley  This is 54 y.o.  female with PMH of besity BMI 32, JYOTI, migraine, smoker and marijuana user, multiple musculoskeletal complaints, left RUTHANN and moderate right hip arthritis which failed injections under ultrasound by Ortho, fibromyalgia, migraine headache, lumbar spondylosis, failed IV infusion therapy but responds very well to chiropractic care and acupuncture and wants to continue with that.  She gets gabapentin minimal amount 300 mg takes him at night as needed.  The right hip injection gave her 100% relief of her pain but only for 3 weeks who is here for follow-up \"I am miserable with pain everywhere after I had my COVID infection\" I explained the patient that sometimes happens and usually self-limited and that should get better by itself and I suggested for her to join the COVID recovery center of .  The patient is complaining of her left trochanteric bursa pain after her left RUTHANN and I told her that we can inject that bursa for her and we will plan on left trochanteric bursa injection.  Ketorolac 60 mg IM was given to the patient and prescription of ketorolac p.o. sent to the pharmacy  Advised the patient to have a right hip replacement but if she still wants to continue " with injection I recommend 80 mg of Depo-Medrol       Procedures:  6/2/2025 right GT bursa steroid injection the patient has had a 100% improvement in pain and function.      Portions of record reviewed for pertinent issues: active problem list, medication list, allergies, family history, social history, notes from last encounter, encounters, lab results, imaging and other available records.      I have personally reviewed the [facility] report for this patient. This report is scanned into the electronic medical record. I have considered the risks of abuse, dependence, addiction and diversion. It showed: No controlled substance, gabapentin 300mg at bedtime.   OPIOID RISK ASSESSMENT SCORE 5/26  Aberrant behavior: none    Past Medical History  She has a past medical history of Abdominal pain, right lower quadrant (06/27/2006), Anxiety, Arthritis, Cardiomyopathy, Depression, Disease of thyroid gland, Fibromyalgia (2023), GERD (gastroesophageal reflux disease), Heart disease, Heart murmur, Hypertension, Hyperthyroidism, and Primary osteoarthritis of left hip (07/12/2019).    Surgical History  She has a past surgical history that includes Other surgical history (10/28/2020); Other surgical history (01/04/2022); and Joint replacement.     Social History  She reports that she has quit smoking. Her smoking use included cigarettes. She has a 17.5 pack-year smoking history. She has been exposed to tobacco smoke. She has never used smokeless tobacco. She reports that she does not currently use alcohol after a past usage of about 1.0 standard drink of alcohol per week. She reports current drug use. Drug: Marijuana.    Family History  Family History[1]     Allergies  Lisinopril    Review of Systems  Review of Systems   Constitutional:  Negative for chills and fatigue.   Respiratory:  Negative for chest tightness, shortness of breath and wheezing.         Current smoker 5 cigarettes a day   Cardiovascular:  Negative for chest pain and  palpitations.   Gastrointestinal:  Negative for constipation, diarrhea and nausea.   Genitourinary:  Negative for frequency and urgency.   Musculoskeletal:  Positive for arthralgias, back pain and myalgias.        Lower back pain radiating to right hip buttocks region   Neurological:  Negative for dizziness, weakness and headaches.   Psychiatric/Behavioral:  Negative for agitation, confusion and suicidal ideas.         Physical Exam  Physical Exam  Constitutional:       General: She is not in acute distress.     Appearance: Normal appearance.      Comments: Lumbar decreased range of motion flexion extension lateral bending, tenderness to bilateral lumbar paraspinals, right GT bursa region and right gluteal region.  Bilateral sit slump negative, bilateral lower extremity vibratory sensation reflex intact.  Denies any bowel or bladder incontinence or saddle paresthesia.     Musculoskeletal:      Lumbar back: Tenderness present. Decreased range of motion.     Neurological:      Mental Status: She is alert.      GCS: GCS eye subscore is 4. GCS verbal subscore is 5. GCS motor subscore is 6.      Cranial Nerves: Cranial nerves 2-12 are intact.      Sensory: Sensation is intact.      Motor: Motor function is intact.      Coordination: Coordination is intact.      Gait: Gait is intact.      Deep Tendon Reflexes:      Reflex Scores:       Patellar reflexes are 3+ on the right side and 3+ on the left side.       Achilles reflexes are 3+ on the right side and 3+ on the left side.    Psychiatric:         Attention and Perception: Attention and perception normal.         Mood and Affect: Mood normal.         Speech: Speech normal.         Behavior: Behavior normal. Behavior is cooperative.         Thought Content: Thought content normal.         Cognition and Memory: Cognition normal.         Judgment: Judgment normal.        Last Recorded Vitals  Blood pressure 116/77, pulse 61, temperature 36.3 °C (97.3 °F), temperature source  "Temporal, resp. rate 10, height 1.575 m (5' 2\"), weight 65.8 kg (145 lb), SpO2 98%, not currently breastfeeding.      Reviewed Labs  Lab Results   Component Value Date    GLUCOSE 103 (H) 06/05/2025    CALCIUM 9.6 06/05/2025     06/05/2025    K 3.8 06/05/2025    CO2 24 06/05/2025     06/05/2025    BUN 15 06/05/2025    CREATININE 0.81 06/05/2025        Assessment/Plan     Miguel Angel Dominguez is a 55 y.o. female recalled past medical history of obesity, JYOTI, migraine, current smoker 5 cigarettes/day, left RUTHANN and moderate right hip arthritis, fibromyalgia, migraines, lumbar spondylosis, failed IV infusion therapy, who is here for follow-up after right greater trochanteric bursa steroid injection, which provided 100% relief of symptoms and continues to provide relief.  However her symptoms are slow return to baseline as she increases her activity levels.  She is currently on gabapentin she reports taking 300 mg at night 2-3 times a week depending on her symptoms.  I encouraged patient to take 300 to 600 mg at bedtime nightly to reduce pain and inflammation.  She was previously recommended for right hip replacement however due to the complications of her left total hip replacement she is very hesitant and reluctant to pursue right total hip replacement.  She continues to be treated with conservative measures which significantly improves her pain and quality of life.  She continues to have tenderness to right GT bursa however symptoms are manageable at this time.  Will consider repeating fluoroscopy guided right GT bursa steroid injection to treat her bursitis.  Will continue with gabapentin, ibuprofen and Tylenol as prescribed.  Provided a referral to [facility] for massage therapy to reduce pain and inflammation.  Plan to follow-up in 3 months or sooner if needed.  Instructed patient to call the office if symptoms become more consistent or are negatively affecting her quality life and she is unable to " sleep due to pain.  Patient verbalized understanding plan of care.      Plan  At least 50% of the visit was involved in the discussion of the options for treatment. We discussed exercises, medication, interventional therapies and surgery. Healthy life style is essential with patient hard work to achieve the wellness. In addition; discussion with the patient and/or family about any of the diagnostic results, impressions and/or recommended diagnostic studies, prognosis, risks and benefits of treatment options, instructions for treatment and/or follow-up, importance of compliance with chosen treatment options, risk-factor reduction, and patient/family education.        *Please note this report has been produced using speech recognition software and may contain errors related to that system including grammar, punctuation and spelling as well as words and phrases that may be inappropriate. If there are questions or concerns, please feel free to contact me to clarify.      I spent 54 minutes in the professional and overall care of this patient.       Jonathan Portillo, APRN-CNP              [1]   Family History  Problem Relation Name Age of Onset    Cardiomyopathy Father Nilo Kramer     Prostate cancer Father Nilo Kramer     Heart attack Father Nilo BIRMINGHAM Lourdeshrcheko     Cancer Father Nilo VINNIE Lourdeshrcheko     Diabetes Maternal Grandmother Heart attacks     Heart disease Maternal Grandmother Heart attacks     Breast cancer Paternal Grandmother Will B Ronald     Diabetes Mother's Sister Rosamaria Brittany Mathis     Heart disease Mother Ofe Dominguez     Cancer Brother German Dominguez     Hypertension Brother Lillian Dominguez

## 2025-07-27 DIAGNOSIS — Z12.11 COLON CANCER SCREENING: Primary | ICD-10-CM

## 2025-07-27 RX ORDER — POLYETHYLENE GLYCOL 3350, SODIUM SULFATE ANHYDROUS, SODIUM BICARBONATE, SODIUM CHLORIDE, POTASSIUM CHLORIDE 236; 22.74; 6.74; 5.86; 2.97 G/4L; G/4L; G/4L; G/4L; G/4L
4000 POWDER, FOR SOLUTION ORAL ONCE
Qty: 4000 ML | Refills: 0 | Status: SHIPPED | OUTPATIENT
Start: 2025-07-27 | End: 2025-07-27

## 2025-07-27 NOTE — PROGRESS NOTES
Previous colonoscopy 2019 at [facility] with removal of a rectal polyp (hyperplastic) as viewed in Care Everywhere.

## 2025-08-05 RX ORDER — ALBUTEROL SULFATE 0.83 MG/ML
3 SOLUTION RESPIRATORY (INHALATION) ONCE
OUTPATIENT
Start: 2025-08-05 | End: 2025-08-05

## 2025-08-05 RX ORDER — ALBUTEROL SULFATE 90 UG/1
4 INHALANT RESPIRATORY (INHALATION) ONCE
OUTPATIENT
Start: 2025-08-05 | End: 2025-08-05

## 2025-08-13 ENCOUNTER — APPOINTMENT (OUTPATIENT)
Dept: RADIOLOGY | Facility: HOSPITAL | Age: 55
End: 2025-08-13
Payer: COMMERCIAL

## 2025-08-14 ENCOUNTER — HOSPITAL ENCOUNTER (OUTPATIENT)
Dept: RESPIRATORY THERAPY | Facility: HOSPITAL | Age: 55
Discharge: HOME | End: 2025-08-14
Payer: COMMERCIAL

## 2025-08-14 DIAGNOSIS — J44.89 EMPHYSEMA WITH CHRONIC BRONCHITIS (MULTI): ICD-10-CM

## 2025-08-19 ENCOUNTER — OFFICE VISIT (OUTPATIENT)
Dept: CARDIOLOGY | Facility: HOSPITAL | Age: 55
End: 2025-08-19
Payer: COMMERCIAL

## 2025-08-19 VITALS
WEIGHT: 143 LBS | BODY MASS INDEX: 26.31 KG/M2 | HEIGHT: 62 IN | HEART RATE: 76 BPM | SYSTOLIC BLOOD PRESSURE: 120 MMHG | DIASTOLIC BLOOD PRESSURE: 80 MMHG

## 2025-08-19 DIAGNOSIS — I10 ESSENTIAL (PRIMARY) HYPERTENSION: Primary | ICD-10-CM

## 2025-08-19 PROCEDURE — 3079F DIAST BP 80-89 MM HG: CPT | Performed by: INTERNAL MEDICINE

## 2025-08-19 PROCEDURE — 99212 OFFICE O/P EST SF 10 MIN: CPT

## 2025-08-19 PROCEDURE — 3074F SYST BP LT 130 MM HG: CPT | Performed by: INTERNAL MEDICINE

## 2025-08-19 PROCEDURE — 3008F BODY MASS INDEX DOCD: CPT | Performed by: INTERNAL MEDICINE

## 2025-08-19 PROCEDURE — 99214 OFFICE O/P EST MOD 30 MIN: CPT | Performed by: INTERNAL MEDICINE

## 2025-08-27 ENCOUNTER — APPOINTMENT (OUTPATIENT)
Dept: RADIOLOGY | Facility: HOSPITAL | Age: 55
End: 2025-08-27
Payer: COMMERCIAL

## 2025-09-03 ENCOUNTER — TELEPHONE (OUTPATIENT)
Dept: PAIN MEDICINE | Facility: CLINIC | Age: 55
End: 2025-09-03
Payer: COMMERCIAL

## 2025-09-04 DIAGNOSIS — M16.11 ARTHRITIS OF RIGHT HIP: Primary | ICD-10-CM

## 2025-09-05 ENCOUNTER — HOSPITAL ENCOUNTER (OUTPATIENT)
Dept: RADIOLOGY | Facility: HOSPITAL | Age: 55
Discharge: HOME | End: 2025-09-05
Payer: COMMERCIAL

## 2025-09-05 ENCOUNTER — OFFICE VISIT (OUTPATIENT)
Dept: OTHER | Facility: CLINIC | Age: 55
End: 2025-09-05
Payer: COMMERCIAL

## 2025-09-05 VITALS
BODY MASS INDEX: 27.23 KG/M2 | TEMPERATURE: 97.7 F | DIASTOLIC BLOOD PRESSURE: 83 MMHG | HEART RATE: 64 BPM | SYSTOLIC BLOOD PRESSURE: 125 MMHG | HEIGHT: 62 IN | OXYGEN SATURATION: 97 % | WEIGHT: 148 LBS

## 2025-09-05 DIAGNOSIS — R05.1 ACUTE COUGH: ICD-10-CM

## 2025-09-05 DIAGNOSIS — U09.9 LONG COVID: Primary | ICD-10-CM

## 2025-09-05 PROCEDURE — 99213 OFFICE O/P EST LOW 20 MIN: CPT | Mod: 25 | Performed by: NURSE PRACTITIONER

## 2025-09-05 PROCEDURE — 3008F BODY MASS INDEX DOCD: CPT | Performed by: NURSE PRACTITIONER

## 2025-09-05 PROCEDURE — 3074F SYST BP LT 130 MM HG: CPT | Performed by: NURSE PRACTITIONER

## 2025-09-05 PROCEDURE — 3079F DIAST BP 80-89 MM HG: CPT | Performed by: NURSE PRACTITIONER

## 2025-09-05 PROCEDURE — 71046 X-RAY EXAM CHEST 2 VIEWS: CPT

## 2025-09-05 PROCEDURE — 99213 OFFICE O/P EST LOW 20 MIN: CPT | Performed by: NURSE PRACTITIONER

## 2025-09-05 ASSESSMENT — PAIN SCALES - GENERAL: PAINLEVEL_OUTOF10: 7

## 2026-01-29 ENCOUNTER — APPOINTMENT (OUTPATIENT)
Dept: PULMONOLOGY | Facility: CLINIC | Age: 56
End: 2026-01-29
Payer: COMMERCIAL